# Patient Record
Sex: MALE | Race: WHITE | NOT HISPANIC OR LATINO | Employment: OTHER | URBAN - NONMETROPOLITAN AREA
[De-identification: names, ages, dates, MRNs, and addresses within clinical notes are randomized per-mention and may not be internally consistent; named-entity substitution may affect disease eponyms.]

---

## 2021-06-23 ENCOUNTER — TRANSCRIBE ORDERS (OUTPATIENT)
Dept: ADMINISTRATIVE | Facility: HOSPITAL | Age: 76
End: 2021-06-23

## 2021-06-23 DIAGNOSIS — R60.0 LOWER EXTREMITY EDEMA: Primary | ICD-10-CM

## 2021-08-06 ENCOUNTER — HOSPITAL ENCOUNTER (OUTPATIENT)
Dept: CARDIOLOGY | Facility: HOSPITAL | Age: 76
Discharge: HOME | End: 2021-08-06
Admitting: FAMILY MEDICINE

## 2021-08-06 VITALS
HEIGHT: 70 IN | SYSTOLIC BLOOD PRESSURE: 120 MMHG | DIASTOLIC BLOOD PRESSURE: 63 MMHG | BODY MASS INDEX: 24.34 KG/M2 | WEIGHT: 170 LBS

## 2021-08-06 DIAGNOSIS — R60.0 LOWER EXTREMITY EDEMA: ICD-10-CM

## 2021-08-06 PROCEDURE — 93356 MYOCRD STRAIN IMG SPCKL TRCK: CPT | Performed by: INTERNAL MEDICINE

## 2021-08-06 PROCEDURE — 93306 TTE W/DOPPLER COMPLETE: CPT

## 2021-08-06 PROCEDURE — 25010000002 PERFLUTREN 6.52 MG/ML SUSPENSION: Performed by: FAMILY MEDICINE

## 2021-08-06 PROCEDURE — 93306 TTE W/DOPPLER COMPLETE: CPT | Performed by: INTERNAL MEDICINE

## 2021-08-06 PROCEDURE — 93356 MYOCRD STRAIN IMG SPCKL TRCK: CPT

## 2021-08-06 RX ADMIN — PERFLUTREN 1.17 MG: 6.52 INJECTION, SUSPENSION INTRAVENOUS at 08:33

## 2021-08-09 LAB
BH CV ECHO MEAS - AO MAX PG (FULL): 3.9 MMHG
BH CV ECHO MEAS - AO MAX PG: 7.6 MMHG
BH CV ECHO MEAS - AO MEAN PG (FULL): 2 MMHG
BH CV ECHO MEAS - AO MEAN PG: 3 MMHG
BH CV ECHO MEAS - AO ROOT AREA (BSA CORRECTED): 1.5
BH CV ECHO MEAS - AO ROOT AREA: 7.1 CM^2
BH CV ECHO MEAS - AO ROOT DIAM: 3 CM
BH CV ECHO MEAS - AO V2 MAX: 138 CM/SEC
BH CV ECHO MEAS - AO V2 MEAN: 78.5 CM/SEC
BH CV ECHO MEAS - AO V2 VTI: 26.6 CM
BH CV ECHO MEAS - AVA(I,A): 2.2 CM^2
BH CV ECHO MEAS - AVA(I,D): 2.2 CM^2
BH CV ECHO MEAS - AVA(V,A): 2.2 CM^2
BH CV ECHO MEAS - AVA(V,D): 2.2 CM^2
BH CV ECHO MEAS - BSA(HAYCOCK): 2 M^2
BH CV ECHO MEAS - BSA: 2 M^2
BH CV ECHO MEAS - BZI_BMI: 25.4 KILOGRAMS/M^2
BH CV ECHO MEAS - BZI_METRIC_HEIGHT: 177.8 CM
BH CV ECHO MEAS - BZI_METRIC_WEIGHT: 80.3 KG
BH CV ECHO MEAS - EDV(CUBED): 88.1 ML
BH CV ECHO MEAS - EDV(MOD-SP2): 91.2 ML
BH CV ECHO MEAS - EDV(MOD-SP4): 102 ML
BH CV ECHO MEAS - EDV(TEICH): 90.1 ML
BH CV ECHO MEAS - EF(CUBED): 76.1 %
BH CV ECHO MEAS - EF(MOD-SP2): 61.2 %
BH CV ECHO MEAS - EF(MOD-SP4): 65.9 %
BH CV ECHO MEAS - EF(TEICH): 68.3 %
BH CV ECHO MEAS - ESV(CUBED): 21 ML
BH CV ECHO MEAS - ESV(MOD-SP2): 35.4 ML
BH CV ECHO MEAS - ESV(MOD-SP4): 34.8 ML
BH CV ECHO MEAS - ESV(TEICH): 28.5 ML
BH CV ECHO MEAS - FS: 38 %
BH CV ECHO MEAS - IVS/LVPW: 1.4
BH CV ECHO MEAS - IVSD: 0.98 CM
BH CV ECHO MEAS - LA DIMENSION: 3.2 CM
BH CV ECHO MEAS - LA/AO: 1.1
BH CV ECHO MEAS - LAT PEAK E' VEL: 12.9 CM/SEC
BH CV ECHO MEAS - LV DIASTOLIC VOL/BSA (35-75): 51.5 ML/M^2
BH CV ECHO MEAS - LV MASS(C)D: 117.4 GRAMS
BH CV ECHO MEAS - LV MASS(C)DI: 59.3 GRAMS/M^2
BH CV ECHO MEAS - LV MAX PG: 3.8 MMHG
BH CV ECHO MEAS - LV MEAN PG: 1 MMHG
BH CV ECHO MEAS - LV SYSTOLIC VOL/BSA (12-30): 17.6 ML/M^2
BH CV ECHO MEAS - LV V1 MAX: 96.9 CM/SEC
BH CV ECHO MEAS - LV V1 MEAN: 53.9 CM/SEC
BH CV ECHO MEAS - LV V1 VTI: 18.7 CM
BH CV ECHO MEAS - LVIDD: 4.5 CM
BH CV ECHO MEAS - LVIDS: 2.8 CM
BH CV ECHO MEAS - LVLD AP2: 8.8 CM
BH CV ECHO MEAS - LVLD AP4: 9 CM
BH CV ECHO MEAS - LVLS AP2: 7.6 CM
BH CV ECHO MEAS - LVLS AP4: 6.7 CM
BH CV ECHO MEAS - LVOT AREA (M): 3.1 CM^2
BH CV ECHO MEAS - LVOT AREA: 3.1 CM^2
BH CV ECHO MEAS - LVOT DIAM: 2 CM
BH CV ECHO MEAS - LVPWD: 0.69 CM
BH CV ECHO MEAS - MED PEAK E' VEL: 9.46 CM/SEC
BH CV ECHO MEAS - MR MAX PG: 13 MMHG
BH CV ECHO MEAS - MR MAX VEL: 179 CM/SEC
BH CV ECHO MEAS - MV A MAX VEL: 93.5 CM/SEC
BH CV ECHO MEAS - MV DEC TIME: 0.23 SEC
BH CV ECHO MEAS - MV E MAX VEL: 101 CM/SEC
BH CV ECHO MEAS - MV E/A: 1.1
BH CV ECHO MEAS - SI(AO): 94.9 ML/M^2
BH CV ECHO MEAS - SI(CUBED): 33.9 ML/M^2
BH CV ECHO MEAS - SI(LVOT): 29.6 ML/M^2
BH CV ECHO MEAS - SI(MOD-SP2): 28.2 ML/M^2
BH CV ECHO MEAS - SI(MOD-SP4): 33.9 ML/M^2
BH CV ECHO MEAS - SI(TEICH): 31 ML/M^2
BH CV ECHO MEAS - SV(AO): 188 ML
BH CV ECHO MEAS - SV(CUBED): 67.1 ML
BH CV ECHO MEAS - SV(LVOT): 58.7 ML
BH CV ECHO MEAS - SV(MOD-SP2): 55.8 ML
BH CV ECHO MEAS - SV(MOD-SP4): 67.2 ML
BH CV ECHO MEAS - SV(TEICH): 61.5 ML
BH CV ECHO MEAS - TR MAX VEL: 115 CM/SEC
BH CV ECHO MEASUREMENTS AVERAGE E/E' RATIO: 9.03
LEFT ATRIUM VOLUME INDEX: 18.3 ML/M2
LEFT ATRIUM VOLUME: 36.3 CM3
MAXIMAL PREDICTED HEART RATE: 146 BPM
STRESS TARGET HR: 124 BPM

## 2022-11-07 ENCOUNTER — OFFICE VISIT (OUTPATIENT)
Dept: GASTROENTEROLOGY | Facility: CLINIC | Age: 77
End: 2022-11-07

## 2022-11-07 VITALS
BODY MASS INDEX: 24.39 KG/M2 | HEIGHT: 70 IN | TEMPERATURE: 97.2 F | HEART RATE: 54 BPM | DIASTOLIC BLOOD PRESSURE: 80 MMHG | OXYGEN SATURATION: 98 % | SYSTOLIC BLOOD PRESSURE: 120 MMHG

## 2022-11-07 DIAGNOSIS — Z78.9 NONSMOKER: ICD-10-CM

## 2022-11-07 DIAGNOSIS — Z85.038 PERSONAL HISTORY OF COLON CANCER: Primary | ICD-10-CM

## 2022-11-07 PROCEDURE — S0260 H&P FOR SURGERY: HCPCS | Performed by: CLINICAL NURSE SPECIALIST

## 2022-11-07 RX ORDER — LATANOPROST 50 UG/ML
SOLUTION/ DROPS OPHTHALMIC
COMMUNITY

## 2022-11-07 RX ORDER — BIMATOPROST 0.01 %
DROPS OPHTHALMIC (EYE)
Status: ON HOLD | COMMUNITY
End: 2023-01-05

## 2022-11-07 NOTE — PROGRESS NOTES
Rishi Ricketts  1945 11/7/2022  Chief Complaint   Patient presents with   • Colonoscopy     Subjective   HPI  Rishi Ricketts is a 77 y.o. male who presents as a referral for preventative maintenance. He has no complaints of nausea or vomiting. No change in bowels. No wt loss. No BRBPR. No melena. There is no family hx for colon cancer. No abdominal pain.    Past Medical History:   Diagnosis Date   • Colon cancer (HCC)     2018 in [place] s/p resection   • Glaucoma    • History of colon polyps      Past Surgical History:   Procedure Laterality Date   • BACK SURGERY     • COLON RESECTION     • LUNG CANCER SURGERY      Left upper lobe removal   • PROSTATE SURGERY     • TONSILLECTOMY       Outpatient Medications Marked as Taking for the 11/7/22 encounter (Office Visit) with Tori Puckett APRN   Medication Sig Dispense Refill   • bimatoprost (Lumigan) 0.01 % ophthalmic drops One drop each eye     • latanoprost (XALATAN) 0.005 % ophthalmic solution latanoprost 0.005 % eye drops   INSTILL 1 DROP INTO AFFECTED EYE(S) BY OPHTHALMIC ROUTE ONCE DAILY INTHE EVENING       No Known Allergies  Social History     Socioeconomic History   • Marital status:    Tobacco Use   • Smoking status: Former     Types: Cigarettes   • Smokeless tobacco: Never   Substance and Sexual Activity   • Alcohol use: Yes     Comment: wine     Family History   Problem Relation Age of Onset   • Colon polyps Neg Hx    • Colon cancer Neg Hx      Health Maintenance   Topic Date Due   • COLORECTAL CANCER SCREENING  Never done   • ZOSTER VACCINE (1 of 2) Never done   • Pneumococcal Vaccine 65+ (1 - PCV) Never done   • COVID-19 Vaccine (3 - Booster for Moderna series) 04/22/2021   • INFLUENZA VACCINE  08/01/2022   • HEPATITIS C SCREENING  Never done   • ANNUAL WELLNESS VISIT  Never done   • TDAP/TD VACCINES (2 - Td or Tdap) 09/13/2027       REVIEW OF SYSTEMS  General: well appearing, no fever chills or sweats, no unexplained wt  "loss  HEENT: no acute visual or hearing disturbances  Cardiovascular: No chest pain or palpitations  Pulmonary: No shortness of breath, coughing, wheezing or hemoptysis  : No burning, urgency, hematuria, or dysuria  Musculoskeletal: No joint pain or stiffness  Peripheral: no edema  Skin: No lesions or rashes  Neuro: No dizziness, headaches, stroke, syncope  Endocrine: No hot or cold intolerances  Hematological: No blood dyscrasias    Objective   Vitals:    11/07/22 0818   BP: 120/80   Pulse: 54   Temp: 97.2 °F (36.2 °C)   TempSrc: Temporal   SpO2: 98%   Height: 177.8 cm (70\")     Body mass index is 24.39 kg/m².  BMI is within normal parameters. No other follow-up for BMI required.      PHYSICAL EXAM  General: age appropriate well nourished well appearing, no acute distress  Head: normocephalic and atraumatic  Global assessment-supple  Neck-No JVD noted, no lymphadenopathy  Pulmonary-clear to auscultation bilaterally, normal respiratory effort  Cardiovascular-normal rate and rhythm, normal heart sounds, S1 and S2 noted  Abdomen-soft, non tender, non distended, normal bowel sounds all 4 quadrants, no hepatosplenomegaly noted  Extremities-No clubbing cyanosis or edema  Neuro-Non focal, converses appropriately, awake, alert, oriented    Assessment & Plan     Diagnoses and all orders for this visit:    1. Personal history of colon cancer (Primary)  -     Case Request; Standing  -     Follow Anesthesia Guidelines / Protocol; Future  -     Obtain Informed Consent; Future  -     Implement Anesthesia Orders Day of Procedure; Standing  -     Obtain Informed Consent; Standing  -     Verify bowel prep was successful; Standing  -     Case Request  -     polyethylene glycol (GoLYTELY) 236 g solution; Take as directed by office instructions.  Dispense: 4000 mL; Refill: 0    2. Nonsmoker        COLONOSCOPY WITH ANESTHESIA (N/A)  Body mass index is 24.39 kg/m².    Patient instructions on prep prior to procedure provided to the " patient.    All risks, benefits, alternatives, and indications of colonoscopy procedure have been discussed with the patient. Risks to include perforation of the colon requiring possible surgery or colostomy, risk of bleeding from biopsies or removal of colon tissue, possibility of missing a colon polyp or cancer, or adverse drug reaction.  Benefits to include the diagnosis and management of disease of the colon and rectum. Alternatives to include barium enema, radiographic evaluation, lab testing or no intervention. Pt verbalizes understanding and agrees.     Tori Puckett, APRN  2022  08:41 CST      IF YOU SMOKE OR USE TOBACCO PLEASE READ THE FOLLOWIN minutes reading provided    Why is smoking bad for me?  Smoking increases the risk of heart disease, lung disease, vascular disease, stroke, and cancer.     If you smoke, STOP!    If you would like more information on quitting smoking, please visit the [place] website: [email or web address]   This link will provide additional resources including the QUIT line and the Beat the Pack support groups.     For more information:    Quit Now [facility]  -QUIT-NOW  [email or web address]

## 2022-11-08 PROBLEM — Z12.11 ENCOUNTER FOR SCREENING FOR MALIGNANT NEOPLASM OF COLON: Status: ACTIVE | Noted: 2022-11-08

## 2023-01-05 ENCOUNTER — HOSPITAL ENCOUNTER (OUTPATIENT)
Facility: HOSPITAL | Age: 78
Setting detail: HOSPITAL OUTPATIENT SURGERY
Discharge: HOME | End: 2023-01-05
Attending: INTERNAL MEDICINE | Admitting: INTERNAL MEDICINE
Payer: MEDICARE

## 2023-01-05 ENCOUNTER — ANESTHESIA (OUTPATIENT)
Dept: GASTROENTEROLOGY | Facility: HOSPITAL | Age: 78
End: 2023-01-05
Payer: MEDICARE

## 2023-01-05 ENCOUNTER — ANESTHESIA EVENT (OUTPATIENT)
Dept: GASTROENTEROLOGY | Facility: HOSPITAL | Age: 78
End: 2023-01-05
Payer: MEDICARE

## 2023-01-05 VITALS
DIASTOLIC BLOOD PRESSURE: 57 MMHG | HEIGHT: 70 IN | WEIGHT: 173 LBS | HEART RATE: 50 BPM | OXYGEN SATURATION: 100 % | BODY MASS INDEX: 24.77 KG/M2 | SYSTOLIC BLOOD PRESSURE: 117 MMHG | RESPIRATION RATE: 16 BRPM | TEMPERATURE: 97.1 F

## 2023-01-05 DIAGNOSIS — Z85.038 PERSONAL HISTORY OF COLON CANCER: ICD-10-CM

## 2023-01-05 DIAGNOSIS — Z12.11 ENCOUNTER FOR SCREENING FOR MALIGNANT NEOPLASM OF COLON: ICD-10-CM

## 2023-01-05 PROCEDURE — 88305 TISSUE EXAM BY PATHOLOGIST: CPT | Performed by: INTERNAL MEDICINE

## 2023-01-05 PROCEDURE — 45385 COLONOSCOPY W/LESION REMOVAL: CPT | Performed by: INTERNAL MEDICINE

## 2023-01-05 PROCEDURE — 25010000002 PROPOFOL 10 MG/ML EMULSION: Performed by: NURSE ANESTHETIST, CERTIFIED REGISTERED

## 2023-01-05 RX ORDER — TIMOLOL MALEATE 5 MG/ML
1 SOLUTION/ DROPS OPHTHALMIC 2 TIMES DAILY
COMMUNITY

## 2023-01-05 RX ORDER — PROPOFOL 10 MG/ML
VIAL (ML) INTRAVENOUS AS NEEDED
Status: DISCONTINUED | OUTPATIENT
Start: 2023-01-05 | End: 2023-01-05 | Stop reason: SURG

## 2023-01-05 RX ORDER — SODIUM CHLORIDE 9 MG/ML
500 INJECTION, SOLUTION INTRAVENOUS CONTINUOUS PRN
Status: DISCONTINUED | OUTPATIENT
Start: 2023-01-05 | End: 2023-01-05 | Stop reason: HOSPADM

## 2023-01-05 RX ORDER — ZINC GLUCONATE 50 MG
TABLET ORAL
COMMUNITY

## 2023-01-05 RX ORDER — LIDOCAINE HYDROCHLORIDE 10 MG/ML
0.5 INJECTION, SOLUTION EPIDURAL; INFILTRATION; INTRACAUDAL; PERINEURAL ONCE AS NEEDED
Status: DISCONTINUED | OUTPATIENT
Start: 2023-01-05 | End: 2023-01-05 | Stop reason: HOSPADM

## 2023-01-05 RX ORDER — SODIUM CHLORIDE 0.9 % (FLUSH) 0.9 %
10 SYRINGE (ML) INJECTION AS NEEDED
Status: DISCONTINUED | OUTPATIENT
Start: 2023-01-05 | End: 2023-01-05 | Stop reason: HOSPADM

## 2023-01-05 RX ADMIN — PROPOFOL 70 MG: 10 INJECTION, EMULSION INTRAVENOUS at 10:20

## 2023-01-05 RX ADMIN — SODIUM CHLORIDE 500 ML: 9 INJECTION, SOLUTION INTRAVENOUS at 09:28

## 2023-01-05 RX ADMIN — PROPOFOL 70 MG: 10 INJECTION, EMULSION INTRAVENOUS at 10:15

## 2023-01-05 NOTE — H&P
[facility]  Pre Procedure History & Physical    Chief Complaint:   History of colon cancer    Subjective     HPI:   Here for colonoscopy.  History of colon cancer    Past Medical History:   Past Medical History:   Diagnosis Date   • Colon cancer (HCC)     2018 in [place] s/p resection   • Glaucoma    • History of colon polyps        Past Surgical History:  Past Surgical History:   Procedure Laterality Date   • BACK SURGERY     • COLON RESECTION     • LUNG CANCER SURGERY      Left upper lobe removal   • PROSTATE SURGERY     • TONSILLECTOMY         Family History:  Family History   Problem Relation Age of Onset   • Colon polyps Neg Hx    • Colon cancer Neg Hx        Social History:   reports that he has quit smoking. His smoking use included cigarettes. He has never used smokeless tobacco. He reports current alcohol use. He reports that he does not currently use drugs.    Medications:   Prior to Admission medications    Medication Sig Start Date End Date Taking? Authorizing Provider   latanoprost (XALATAN) 0.005 % ophthalmic solution latanoprost 0.005 % eye drops   INSTILL 1 DROP INTO AFFECTED EYE(S) BY OPHTHALMIC ROUTE ONCE DAILY INTHE EVENING   Yes Wali Guan MD   timolol (TIMOPTIC) 0.5 % ophthalmic solution 1 drop 2 (Two) Times a Day.   Yes ProviderWali MD   Zinc 50 MG tablet Take  by mouth.    ProviderWali MD   bimatoprost (Lumigan) 0.01 % ophthalmic drops One drop each eye  1/5/23  ProviderWali MD   polyethylene glycol (GoLYTELY) 236 g solution Take as directed by office instructions. 11/7/22 1/5/23  Tori Puckett APRN       Allergies:  Patient has no known allergies.    Objective     Blood pressure 147/60, pulse 53, temperature 97.1 °F (36.2 °C), temperature source Temporal, resp. rate 18, height 177.8 cm (70\"), weight 78.5 kg (173 lb), SpO2 97 %.    Physical Exam   Constitutional: Pt is oriented to person, place, and in no distress.   HENT:  Mouth/Throat: Oropharynx is clear.   Cardiovascular: Normal rate, regular rhythm.    Pulmonary/Chest: Effort normal. No respiratory distress. No  wheezes.   Abdominal: Soft. Non-distended.  Skin: Skin is warm and dry.   Psychiatric: Mood, memory, affect and judgment appear normal.     Assessment & Plan     Diagnosis:  History of colon cancer    Anticipated Surgical Procedure:    Proceed with colonoscopy as scheduled    The following major R/B/A were discussed with the patient, however the list is not all inclusive . Risk:  Bleeding (immediate and delayed), perforation (rupture or tear), reaction to medication, missed lesion/cancer, pain during the procedure, infection, need for surgery, need for ostomy, need for mechanical ventilation (breathing machine), death.  Benefits: removal of polyp/tissue, burn/clip/or inject to stop bleeding, removal of foreign body, dilate any stricture.  Alternatives: Xray or CT, surgery, do nothing with associated risk   The patient was given time to ask question and received explanation, and agrees to proceed as per History and Physical.   No guarantee given or expressed.    EMR Dragon/transcription disclaimer: Much of this encounter note is an electronic transcription/translation of spoken language to printed text.  The electronic translation of spoken language may permit erroneous, or at times, nonsensical words or phrases to be inadvertently transcribed.  Although I have reviewed the note for such errors, some may still exist.    Desean Llanos MD  10:14 CST  1/5/2023     Instructed patient/caregiver to follow-up with primary care physician./Instructed patient/caregiver regarding signs and symptoms of infection./Elevate the injured extremity as instructed./Verbal/written post procedure instructions were given to patient/caregiver.

## 2023-01-05 NOTE — ANESTHESIA PREPROCEDURE EVALUATION
Anesthesia Evaluation     Patient summary reviewed   no history of anesthetic complications:  NPO Solid Status: > 8 hours             Airway   Mallampati: II  TM distance: >3 FB  Neck ROM: full  No difficulty expected  Dental - normal exam     Pulmonary    (+) lung cancer (s/p left upper lobectomy),   Cardiovascular - negative cardio ROS  Exercise tolerance: excellent (>7 METS)        Neuro/Psych- negative ROS  GI/Hepatic/Renal/Endo - negative ROS     Musculoskeletal     Abdominal    Substance History      OB/GYN          Other      history of cancer (colon 2018)    ROS/Med Hx Other: glaucoma                  Anesthesia Plan    ASA 2     MAC     (Patient voiced concerns about receiving anesthesia and states he would like to consider being awake for procedure. I went ahead and discussed care and consented in event he elects to proceed with anesthesia after discussion with Dr. Llanos)  intravenous induction     Anesthetic plan, risks, benefits, and alternatives have been provided, discussed and informed consent has been obtained with: patient and spouse/significant other.        CODE STATUS:

## 2023-01-05 NOTE — ANESTHESIA POSTPROCEDURE EVALUATION
Patient: Rishi Ricketts    Procedure Summary     Date: 01/05/23 Room / Location: [facility] ENDOSCOPY 4 / [facility]    Anesthesia Start: 1015 Anesthesia Stop: 1032    Procedure: COLONOSCOPY WITH ANESTHESIA Diagnosis:       Encounter for screening for malignant neoplasm of colon      (Encounter for screening for malignant neoplasm of colon [Z12.11])    Surgeons: Desean Llanos MD Provider: Felipe Wiggins CRNA    Anesthesia Type: MAC ASA Status: 2          Anesthesia Type: MAC    Vitals  Vitals Value Taken Time   BP     Temp     Pulse 56 01/05/23 1033   Resp     SpO2 99 % 01/05/23 1033   Vitals shown include unvalidated device data.        Post Anesthesia Care and Evaluation    Patient location during evaluation: PHASE II  Patient participation: complete - patient participated  Level of consciousness: awake and alert  Pain management: adequate    Airway patency: patent  Anesthetic complications: No anesthetic complications  PONV Status: none  Cardiovascular status: acceptable and stable  Respiratory status: acceptable and unassisted  Hydration status: acceptable

## 2023-01-06 LAB
CYTO UR: NORMAL
LAB AP CASE REPORT: NORMAL
Lab: NORMAL
PATH REPORT.FINAL DX SPEC: NORMAL
PATH REPORT.GROSS SPEC: NORMAL

## 2023-11-16 ENCOUNTER — TRANSCRIBE ORDERS (OUTPATIENT)
Dept: ADMINISTRATIVE | Facility: HOSPITAL | Age: 78
End: 2023-11-16
Payer: MEDICARE

## 2023-11-16 DIAGNOSIS — C34.90 MALIGNANT NEOPLASM OF BRONCHUS AND LUNG: Primary | ICD-10-CM

## 2023-11-30 ENCOUNTER — HOSPITAL ENCOUNTER (OUTPATIENT)
Dept: CT IMAGING | Facility: HOSPITAL | Age: 78
Discharge: HOME | End: 2023-11-30
Admitting: FAMILY MEDICINE
Payer: MEDICARE

## 2023-11-30 DIAGNOSIS — C34.90 MALIGNANT NEOPLASM OF BRONCHUS AND LUNG: ICD-10-CM

## 2023-11-30 LAB — CREAT BLDA-MCNC: 0.9 MG/DL (ref 0.6–1.3)

## 2023-11-30 PROCEDURE — 74178 CT ABD&PLV WO CNTR FLWD CNTR: CPT

## 2023-11-30 PROCEDURE — 82565 ASSAY OF CREATININE: CPT

## 2023-11-30 PROCEDURE — 25510000001 IOPAMIDOL 61 % SOLUTION: Performed by: FAMILY MEDICINE

## 2023-11-30 PROCEDURE — 71260 CT THORAX DX C+: CPT

## 2023-11-30 RX ADMIN — IOPAMIDOL 100 ML: 612 INJECTION, SOLUTION INTRAVENOUS at 09:28

## 2024-01-16 ENCOUNTER — OFFICE VISIT (OUTPATIENT)
Dept: PULMONOLOGY | Facility: CLINIC | Age: 79
End: 2024-01-16
Payer: MEDICARE

## 2024-01-16 VITALS
BODY MASS INDEX: 23.19 KG/M2 | HEIGHT: 70 IN | HEART RATE: 76 BPM | WEIGHT: 162 LBS | DIASTOLIC BLOOD PRESSURE: 70 MMHG | SYSTOLIC BLOOD PRESSURE: 136 MMHG | OXYGEN SATURATION: 100 %

## 2024-01-16 DIAGNOSIS — R91.8 LUNG INFILTRATE: Primary | ICD-10-CM

## 2024-01-16 DIAGNOSIS — Z29.11 NEED FOR PROPHYLACTIC VACCINATION AND INOCULATION AGAINST RESPIRATORY SYNCYTIAL VIRUS (RSV): ICD-10-CM

## 2024-01-16 DIAGNOSIS — Z85.118 HISTORY OF LUNG CANCER: ICD-10-CM

## 2024-01-16 PROCEDURE — 99204 OFFICE O/P NEW MOD 45 MIN: CPT | Performed by: INTERNAL MEDICINE

## 2024-01-16 RX ORDER — FLUTICASONE PROPIONATE 50 MCG
SPRAY, SUSPENSION (ML) NASAL
COMMUNITY

## 2024-01-16 RX ORDER — TURMERIC 400 MG
CAPSULE ORAL
COMMUNITY

## 2024-01-16 NOTE — PROGRESS NOTES
Background:  Pt w hx lung ca, tree in bud ct infiltrate, hx colon ca.   Chief Complaint  abnormal findings of diagnostic imaging    Subjective    History of Present Illness    Rishi Ricketts presents to [facility].  History of Present Illness  I am asked to see him to evaluate lung nodules.  He is s/p CHRISTINA lobectomy for lung cancer in 2019, prostate cancer resection 2015, colon cancer resection in 2018.  He had routine follow up scanning in [place] a year ago, and has since established in this area.  He recently purchased a home which he has been renovating extensively, and he has been doing seeding and other outdoor work stirring up dust this fall.  His newest ct was done locally for follow up showing some nodular and tree in bud findings in the LLL.  We are asked to evaluate,  no symptoms currently.  He has brought his old films from [facility] on a disc.         has a past medical history of Colon cancer, Glaucoma, and History of colon polyps.   has a past surgical history that includes Colectomy; Prostate surgery; Lung cancer surgery; Tonsillectomy; Back surgery; and Colonoscopy (N/A, 1/5/2023).  family history is not on file.   reports that he quit smoking about 16 years ago. His smoking use included cigarettes. He has a 20.00 pack-year smoking history. He has never used smokeless tobacco. He reports current alcohol use. He reports that he does not currently use drugs.  No Known Allergies  Current Outpatient Medications   Medication Instructions    Ascorbic Acid (Vitamin C) 500 MG chewable tablet     fluticasone (FLONASE) 50 MCG/ACT nasal spray fluticasone propionate 50 mcg/actuation nasal spray,suspension   Spray 1 spray every day by intranasal route.    latanoprost (XALATAN) 0.005 % ophthalmic solution latanoprost 0.005 % eye drops   INSTILL 1 DROP INTO AFFECTED EYE(S) BY OPHTHALMIC ROUTE ONCE DAILY INTHE EVENING    NON FORMULARY Beet  "supplement 500 mg once a day    RSVPreF3 Vac Recomb Adjuvanted (AREXVY) 120 mcg, Intramuscular, Once    timolol (TIMOPTIC) 0.5 % ophthalmic solution 1 drop, 2 Times Daily    Turmeric 400 MG capsule Oral    Zinc 50 MG tablet Oral      Objective     Vital Signs:   /70   Pulse 76   Ht 177.8 cm (70\")   Wt 73.5 kg (162 lb)   SpO2 100% Comment: RA  BMI 23.24 kg/m²   Physical Exam  Constitutional:       Appearance: Normal appearance. He is not ill-appearing or diaphoretic.   Eyes:      Extraocular Movements: Extraocular movements intact.   Pulmonary:      Effort: Pulmonary effort is normal. No respiratory distress.      Breath sounds: No wheezing, rhonchi or rales.   Skin:     Coloration: Skin is not jaundiced.      Findings: No erythema or rash.   Neurological:      Mental Status: He is alert.        Result Review  Data Reviewed:    CT Chest With Contrast Diagnostic (11/30/2023 09:28)     Lungs/Pleura: Prior postsurgical changes in the left lung. Tree-in-bud  as well as small nodular opacities are noted in the posterior left lung.  Minimal peripheral parenchymal scarring.  Nodules: No suspicious pulmonary nodules.  Lower Neck: Subcentimeter right thyroid nodule.  Mediastinum/Hilum: No enlarged lymphadenopathy. Small hiatal hernia.  Personal review of imaging : CT scan shows small nodules, tree in bud densities on left               Assessment and Plan   Diagnoses and all orders for this visit:    1. Lung infiltrate (Primary)  Comments:  Tree in bud idntified 2023  Orders:  -     CT Chest Without Contrast Diagnostic; Future    2. Need for prophylactic vaccination and inoculation against respiratory syncytial virus (RSV)  -     RSVPreF3 Vac Recomb Adjuvanted (AREXVY) 120 MCG/0.5ML reconstituted suspension injection; Inject 0.5 mL into the appropriate muscle as directed by prescriber 1 (One) Time for 1 dose.  Dispense: 1 each; Refill: 0    3. History of lung cancer  Comments:  CHRISTINA resected 2019, no adjuvant tx " needed    We have pulled up the previous scan from 2022 at [facility].  There is some mild tree in bud nodularity in the LLL on that film which looks largely the same.  More central area might be a little more prominent on the scan from November.  Given his history, there is an obvious concern for cancer with any xray abnormality for him.  Will plan 3 mos follow up ct which will be next month, load outside films into pacs for comparison and reassess.  If there is a progressive lesion in LLL, will consider ion robotic bronchoscopy to assess further, otherwise continue close surveillance    Follow Up   Return in about 1 month (around 2/16/2024) for review CT.  Patient was given instructions and counseling regarding his condition or for health maintenance advice. Please see specific information pulled into the AVS if appropriate.    Electronically signed by Bruno Sands MD, 1/16/2024, 17:25 CST

## 2024-01-24 ENCOUNTER — PATIENT ROUNDING (BHMG ONLY) (OUTPATIENT)
Dept: PULMONOLOGY | Facility: CLINIC | Age: 79
End: 2024-01-24
Payer: MEDICARE

## 2024-01-24 NOTE — PROGRESS NOTES
January 24, 2024    Hello, may I speak with Rishi Ricketts?    My name is Violet Ricketts    I am  with [facility]  [address]  [facility] [address] [phone].    Before we get started may I verify your date of birth? 1945    I am calling to officially welcome you to our practice and ask about your recent visit. Is this a good time to talk? yes    Tell me about your visit with us. What things went well?  Office has good set up.  Provider was detailed in explanations.       We're always looking for ways to make our patients' experiences even better. Do you have recommendations on ways we may improve?  no    Overall were you satisfied with your first visit to our practice? yes       I appreciate you taking the time to speak with me today. Is there anything else I can do for you? no      Thank you, and have a great day.

## 2024-02-13 NOTE — PROGRESS NOTES
Chief Complaint  Lung infiltrate    Subjective    History of Present Illness {CC  Problem List  Visit Diagnosis   Encounters  Notes  Medications  Labs  Result Review Imaging  Media     iRshi Ricketts presents to [facility] for:    History of Present Illness  Mr. Ricketts is here for follow up nodular infiltrate in left lower lobe. He is due for repeat ct chest now. Has a history of lung cancer and CHRISTINA lobectomy in 2019. Prostate cancer resection 2015 and colon cancer resection in 2018.  He quit smoking in .  He denies any dyspnea or coughing or hemoptysis.         Prior to Admission medications    Medication Sig Start Date End Date Taking? Authorizing Provider   Ascorbic Acid (Vitamin C) 500 MG chewable tablet  23   Wali Guan MD   fluticasone (FLONASE) 50 MCG/ACT nasal spray fluticasone propionate 50 mcg/actuation nasal spray,suspension   Spray 1 spray every day by intranasal route.    Wali Guan MD   latanoprost (XALATAN) 0.005 % ophthalmic solution latanoprost 0.005 % eye drops   INSTILL 1 DROP INTO AFFECTED EYE(S) BY OPHTHALMIC ROUTE ONCE DAILY INTHE EVENING    Wali Guan MD   NON FORMULARY Beet supplement 500 mg once a day 23   Wali Guan MD   timolol (TIMOPTIC) 0.5 % ophthalmic solution 1 drop 2 (Two) Times a Day.    Wali Guan MD   Turmeric 400 MG capsule Take  by mouth.    Wali Guan MD   Zinc 50 MG tablet Take  by mouth.    Wali Guan MD       Social History     Socioeconomic History    Marital status:    Tobacco Use    Smoking status: Former     Packs/day: 1.00     Years: 22.00     Additional pack years: 0.00     Total pack years: 22.00     Types: Cigarettes     Start date: 1967     Quit date: 1989     Years since quittin.1     Passive exposure: Never    Smokeless tobacco: Never    Tobacco comments:     light smoker   Vaping Use     "Vaping Use: Never used   Substance and Sexual Activity    Alcohol use: Yes     Comment: Two glasses of wine per month at most    Drug use: Never    Sexual activity: Not Currently     Partners: Female       Objective   Vital Signs:   /60   Pulse 71   Ht 177.8 cm (70\")   Wt 68.9 kg (152 lb)   SpO2 98% Comment: RA  BMI 21.81 kg/m²     Physical Exam  Constitutional:       General: He is not in acute distress.  HENT:      Head: Normocephalic.      Nose: Nose normal.      Mouth/Throat:      Mouth: Mucous membranes are moist.   Eyes:      General: No scleral icterus.  Cardiovascular:      Rate and Rhythm: Normal rate.   Pulmonary:      Effort: No respiratory distress.   Abdominal:      General: There is no distension.   Neurological:      Mental Status: He is alert and oriented to person, place, and time.   Psychiatric:         Mood and Affect: Mood normal.         Behavior: Behavior is cooperative.        Result Review :{ Labs  Result Review  Imaging  Med Tab  Media :          No results found for this or any previous visit.                     My interpretation of imaging: New areas of nodular infiltrates in the left lung, nodular infiltrate within the posterior left lung increased in size to approximately 3.6 x 0.9 cm      Assessment and Plan {CC Problem List  Visit Diagnosis  ROS  Review (Popup)  Health Maintenance  Quality  BestPractice  Medications  SmartSets  SnapShot Encounters  Media      Diagnoses and all orders for this visit:    1. Lung infiltrate (Primary)    2. History of lung cancer        BMI is within normal parameters. No other follow-up for BMI required.      We reviewed CT chest and compared to prior.  There are some areas on the CT scan that are more prominent than on the previous scan in particular the posterior left lung showing an increased and nodular infiltrate to approximately 3.6 x 0.9 cm.  Dr. Sands reviewed the CT scan as well and is recommending Ion robotic " bronchoscopy to assess further.  We discussed nature of procedure and risks and benefits.  He is not on any blood thinning medication.  Will schedule for procedure with Dr. Sands.  Office follow-up in approximately 6 weeks for postprocedural visit.  Call in the interim if needed.    Malou Kelly, APRN  2/20/2024  16:29 CST    Follow Up {Instructions Charge Capture  Follow-up Communications   Return in about 6 weeks (around 4/2/2024).    Patient was given instructions and counseling regarding his condition or for health maintenance advice. Please see specific information pulled into the AVS if appropriate.

## 2024-02-16 ENCOUNTER — HOSPITAL ENCOUNTER (OUTPATIENT)
Dept: CT IMAGING | Facility: HOSPITAL | Age: 79
Discharge: HOME | End: 2024-02-16
Payer: MEDICARE

## 2024-02-16 DIAGNOSIS — R91.8 LUNG INFILTRATE: ICD-10-CM

## 2024-02-16 PROCEDURE — 71250 CT THORAX DX C-: CPT

## 2024-02-20 ENCOUNTER — PREP FOR SURGERY (OUTPATIENT)
Dept: OTHER | Facility: HOSPITAL | Age: 79
End: 2024-02-20
Payer: MEDICARE

## 2024-02-20 ENCOUNTER — OFFICE VISIT (OUTPATIENT)
Dept: PULMONOLOGY | Facility: CLINIC | Age: 79
End: 2024-02-20
Payer: MEDICARE

## 2024-02-20 VITALS
DIASTOLIC BLOOD PRESSURE: 60 MMHG | HEART RATE: 71 BPM | SYSTOLIC BLOOD PRESSURE: 110 MMHG | HEIGHT: 70 IN | OXYGEN SATURATION: 98 % | BODY MASS INDEX: 21.76 KG/M2 | WEIGHT: 152 LBS

## 2024-02-20 DIAGNOSIS — R91.8 LUNG INFILTRATE: Primary | Chronic | ICD-10-CM

## 2024-02-20 DIAGNOSIS — R91.1 NODULE OF LEFT LUNG: ICD-10-CM

## 2024-02-20 DIAGNOSIS — Z85.118 HISTORY OF LUNG CANCER: ICD-10-CM

## 2024-02-20 DIAGNOSIS — R91.8 LUNG INFILTRATE: Primary | ICD-10-CM

## 2024-02-20 PROCEDURE — 1160F RVW MEDS BY RX/DR IN RCRD: CPT | Performed by: NURSE PRACTITIONER

## 2024-02-20 PROCEDURE — 1159F MED LIST DOCD IN RCRD: CPT | Performed by: NURSE PRACTITIONER

## 2024-02-20 PROCEDURE — 99213 OFFICE O/P EST LOW 20 MIN: CPT | Performed by: NURSE PRACTITIONER

## 2024-02-20 RX ORDER — SODIUM CHLORIDE 9 MG/ML
40 INJECTION, SOLUTION INTRAVENOUS AS NEEDED
OUTPATIENT
Start: 2024-02-20

## 2024-02-20 RX ORDER — SODIUM CHLORIDE 0.9 % (FLUSH) 0.9 %
10 SYRINGE (ML) INJECTION AS NEEDED
OUTPATIENT
Start: 2024-02-20

## 2024-02-20 RX ORDER — SODIUM CHLORIDE 0.9 % (FLUSH) 0.9 %
10 SYRINGE (ML) INJECTION EVERY 12 HOURS SCHEDULED
OUTPATIENT
Start: 2024-02-20

## 2024-02-20 NOTE — H&P
Chief Complaint  Lung infiltrate    Subjective    History of Present Illness {CC  Problem List  Visit Diagnosis   Encounters  Notes  Medications  Labs  Result Review Imaging  Media     Rishi Ricketts presents to [facility] PULMONARY & CRITICAL CARE MEDICINE for:    History of Present Illness  Mr. Ricketts is here for follow up nodular infiltrate in left lower lobe. He is due for repeat ct chest now. Has a history of lung cancer and CHRISTINA lobectomy in 2019. Prostate cancer resection 2015 and colon cancer resection in 2018.  He quit smoking in .  He denies any dyspnea or coughing or hemoptysis.         Prior to Admission medications    Medication Sig Start Date End Date Taking? Authorizing Provider   Ascorbic Acid (Vitamin C) 500 MG chewable tablet  23   Wali Guan MD   fluticasone (FLONASE) 50 MCG/ACT nasal spray fluticasone propionate 50 mcg/actuation nasal spray,suspension   Spray 1 spray every day by intranasal route.    Wali Guan MD   latanoprost (XALATAN) 0.005 % ophthalmic solution latanoprost 0.005 % eye drops   INSTILL 1 DROP INTO AFFECTED EYE(S) BY OPHTHALMIC ROUTE ONCE DAILY INTHE EVENING    Wali Guan MD   NON FORMULARY Beet supplement 500 mg once a day 23   Wali Guan MD   timolol (TIMOPTIC) 0.5 % ophthalmic solution 1 drop 2 (Two) Times a Day.    Wali Guan MD   Turmeric 400 MG capsule Take  by mouth.    Wali Guan MD   Zinc 50 MG tablet Take  by mouth.    Wali Guan MD       Social History     Socioeconomic History    Marital status:    Tobacco Use    Smoking status: Former     Packs/day: 1.00     Years: 22.00     Additional pack years: 0.00     Total pack years: 22.00     Types: Cigarettes     Start date: 1967     Quit date: 1989     Years since quittin.1     Passive exposure: Never    Smokeless tobacco: Never    Tobacco comments:     light smoker   Vaping Use     "Vaping Use: Never used   Substance and Sexual Activity    Alcohol use: Yes     Comment: Two glasses of wine per month at most    Drug use: Never    Sexual activity: Not Currently     Partners: Female       Objective   Vital Signs:   /60   Pulse 71   Ht 177.8 cm (70\")   Wt 68.9 kg (152 lb)   SpO2 98% Comment: RA  BMI 21.81 kg/m²     Physical Exam  Constitutional:       General: He is not in acute distress.  HENT:      Head: Normocephalic.      Nose: Nose normal.      Mouth/Throat:      Mouth: Mucous membranes are moist.   Eyes:      General: No scleral icterus.  Cardiovascular:      Rate and Rhythm: Normal rate.   Pulmonary:      Effort: No respiratory distress.   Abdominal:      General: There is no distension.   Neurological:      Mental Status: He is alert and oriented to person, place, and time.   Psychiatric:         Mood and Affect: Mood normal.         Behavior: Behavior is cooperative.        Result Review :{ Labs  Result Review  Imaging  Med Tab  Media :          No results found for this or any previous visit.                     My interpretation of imaging: New areas of nodular infiltrates in the left lung, nodular infiltrate within the posterior left lung increased in size to approximately 3.6 x 0.9 cm      Assessment and Plan {CC Problem List  Visit Diagnosis  ROS  Review (Popup)  Health Maintenance  Quality  BestPractice  Medications  SmartSets  SnapShot Encounters  Media      Diagnoses and all orders for this visit:    1. Lung infiltrate (Primary)    2. History of lung cancer        BMI is within normal parameters. No other follow-up for BMI required.      We reviewed CT chest and compared to prior.  There are some areas on the CT scan that are more prominent than on the previous scan in particular the posterior left lung showing an increased and nodular infiltrate to approximately 3.6 x 0.9 cm.  Dr. Sands reviewed the CT scan as well and is recommending Ion robotic " bronchoscopy to assess further.  We discussed nature of procedure and risks and benefits.  He is not on any blood thinning medication.  Will schedule for procedure with Dr. Sands.  Office follow-up in approximately 6 weeks for postprocedural visit.  Call in the interim if needed.    Malou Kelly, APRN  2/20/2024  16:29 CST    Follow Up {Instructions Charge Capture  Follow-up Communications   Return in about 6 weeks (around 4/2/2024).    Patient was given instructions and counseling regarding his condition or for health maintenance advice. Please see specific information pulled into the AVS if appropriate.

## 2024-02-20 NOTE — H&P (VIEW-ONLY)
Chief Complaint  Lung infiltrate    Subjective    History of Present Illness {CC  Problem List  Visit Diagnosis   Encounters  Notes  Medications  Labs  Result Review Imaging  Media     Rishi Ricketts presents to [facility] PULMONARY & CRITICAL CARE MEDICINE for:    History of Present Illness  Mr. Ricketts is here for follow up nodular infiltrate in left lower lobe. He is due for repeat ct chest now. Has a history of lung cancer and CHRISTINA lobectomy in 2019. Prostate cancer resection 2015 and colon cancer resection in 2018.  He quit smoking in .  He denies any dyspnea or coughing or hemoptysis.         Prior to Admission medications    Medication Sig Start Date End Date Taking? Authorizing Provider   Ascorbic Acid (Vitamin C) 500 MG chewable tablet  23   Wali Guan MD   fluticasone (FLONASE) 50 MCG/ACT nasal spray fluticasone propionate 50 mcg/actuation nasal spray,suspension   Spray 1 spray every day by intranasal route.    Wali Guan MD   latanoprost (XALATAN) 0.005 % ophthalmic solution latanoprost 0.005 % eye drops   INSTILL 1 DROP INTO AFFECTED EYE(S) BY OPHTHALMIC ROUTE ONCE DAILY INTHE EVENING    Wali Guan MD   NON FORMULARY Beet supplement 500 mg once a day 23   Wali Guan MD   timolol (TIMOPTIC) 0.5 % ophthalmic solution 1 drop 2 (Two) Times a Day.    Wali Guan MD   Turmeric 400 MG capsule Take  by mouth.    Wali Guan MD   Zinc 50 MG tablet Take  by mouth.    Wali Guan MD       Social History     Socioeconomic History    Marital status:    Tobacco Use    Smoking status: Former     Packs/day: 1.00     Years: 22.00     Additional pack years: 0.00     Total pack years: 22.00     Types: Cigarettes     Start date: 1967     Quit date: 1989     Years since quittin.1     Passive exposure: Never    Smokeless tobacco: Never    Tobacco comments:     light smoker   Vaping Use     "Vaping Use: Never used   Substance and Sexual Activity    Alcohol use: Yes     Comment: Two glasses of wine per month at most    Drug use: Never    Sexual activity: Not Currently     Partners: Female       Objective   Vital Signs:   /60   Pulse 71   Ht 177.8 cm (70\")   Wt 68.9 kg (152 lb)   SpO2 98% Comment: RA  BMI 21.81 kg/m²     Physical Exam  Constitutional:       General: He is not in acute distress.  HENT:      Head: Normocephalic.      Nose: Nose normal.      Mouth/Throat:      Mouth: Mucous membranes are moist.   Eyes:      General: No scleral icterus.  Cardiovascular:      Rate and Rhythm: Normal rate.   Pulmonary:      Effort: No respiratory distress.   Abdominal:      General: There is no distension.   Neurological:      Mental Status: He is alert and oriented to person, place, and time.   Psychiatric:         Mood and Affect: Mood normal.         Behavior: Behavior is cooperative.        Result Review :{ Labs  Result Review  Imaging  Med Tab  Media :          No results found for this or any previous visit.                     My interpretation of imaging: New areas of nodular infiltrates in the left lung, nodular infiltrate within the posterior left lung increased in size to approximately 3.6 x 0.9 cm      Assessment and Plan {CC Problem List  Visit Diagnosis  ROS  Review (Popup)  Health Maintenance  Quality  BestPractice  Medications  SmartSets  SnapShot Encounters  Media      Diagnoses and all orders for this visit:    1. Lung infiltrate (Primary)    2. History of lung cancer        BMI is within normal parameters. No other follow-up for BMI required.      We reviewed CT chest and compared to prior.  There are some areas on the CT scan that are more prominent than on the previous scan in particular the posterior left lung showing an increased and nodular infiltrate to approximately 3.6 x 0.9 cm.  Dr. Sands reviewed the CT scan as well and is recommending Ion robotic " bronchoscopy to assess further.  We discussed nature of procedure and risks and benefits.  He is not on any blood thinning medication.  Will schedule for procedure with Dr. Sands.  Office follow-up in approximately 6 weeks for postprocedural visit.  Call in the interim if needed.    Malou Kelly, APRN  2/20/2024  16:29 CST    Follow Up {Instructions Charge Capture  Follow-up Communications   Return in about 6 weeks (around 4/2/2024).    Patient was given instructions and counseling regarding his condition or for health maintenance advice. Please see specific information pulled into the AVS if appropriate.

## 2024-02-22 ENCOUNTER — TELEPHONE (OUTPATIENT)
Dept: PULMONOLOGY | Facility: CLINIC | Age: 79
End: 2024-02-22
Payer: MEDICARE

## 2024-02-27 ENCOUNTER — PRE-ADMISSION TESTING (OUTPATIENT)
Dept: PREADMISSION TESTING | Facility: HOSPITAL | Age: 79
End: 2024-02-27
Payer: MEDICARE

## 2024-02-27 VITALS
OXYGEN SATURATION: 99 % | HEIGHT: 70 IN | WEIGHT: 154.1 LBS | HEART RATE: 51 BPM | SYSTOLIC BLOOD PRESSURE: 143 MMHG | DIASTOLIC BLOOD PRESSURE: 62 MMHG | RESPIRATION RATE: 16 BRPM | BODY MASS INDEX: 22.06 KG/M2

## 2024-02-27 LAB
ANION GAP SERPL CALCULATED.3IONS-SCNC: 6 MMOL/L (ref 5–15)
BUN SERPL-MCNC: 11 MG/DL (ref 8–23)
BUN/CREAT SERPL: 16.7 (ref 7–25)
CALCIUM SPEC-SCNC: 9 MG/DL (ref 8.6–10.5)
CHLORIDE SERPL-SCNC: 105 MMOL/L (ref 98–107)
CO2 SERPL-SCNC: 29 MMOL/L (ref 22–29)
CREAT SERPL-MCNC: 0.66 MG/DL (ref 0.76–1.27)
DEPRECATED RDW RBC AUTO: 43.8 FL (ref 37–54)
EGFRCR SERPLBLD CKD-EPI 2021: 96.6 ML/MIN/1.73
ERYTHROCYTE [DISTWIDTH] IN BLOOD BY AUTOMATED COUNT: 14 % (ref 12.3–15.4)
GLUCOSE SERPL-MCNC: 97 MG/DL (ref 65–99)
HCT VFR BLD AUTO: 39.5 % (ref 37.5–51)
HGB BLD-MCNC: 12.7 G/DL (ref 13–17.7)
MCH RBC QN AUTO: 27.8 PG (ref 26.6–33)
MCHC RBC AUTO-ENTMCNC: 32.2 G/DL (ref 31.5–35.7)
MCV RBC AUTO: 86.4 FL (ref 79–97)
PLATELET # BLD AUTO: 205 10*3/MM3 (ref 140–450)
PMV BLD AUTO: 11.8 FL (ref 6–12)
POTASSIUM SERPL-SCNC: 4.6 MMOL/L (ref 3.5–5.2)
RBC # BLD AUTO: 4.57 10*6/MM3 (ref 4.14–5.8)
SODIUM SERPL-SCNC: 140 MMOL/L (ref 136–145)
WBC NRBC COR # BLD AUTO: 6.48 10*3/MM3 (ref 3.4–10.8)

## 2024-02-27 PROCEDURE — 80048 BASIC METABOLIC PNL TOTAL CA: CPT

## 2024-02-27 PROCEDURE — 85027 COMPLETE CBC AUTOMATED: CPT

## 2024-02-27 PROCEDURE — 36415 COLL VENOUS BLD VENIPUNCTURE: CPT

## 2024-02-27 NOTE — DISCHARGE INSTRUCTIONS
Before you come to the hospital        Arrival time: AS DIRECTED BY OFFICE              ALL OTHER HOME MEDICATION CHECK WITH YOUR PHYSICIAN (especially if   you are taking diabetes medicines or blood thinners)    Do not take any Erectile Dysfunction medications (EX: CIALIS, VIAGRA) 24 hours prior to surgery.              Eating and drinking restrictions prior to scheduled arrival time    2 Hours before arrival time STOP   Drinking Clear liquids (water, black coffee-NO CREAM,  apple juice-no pulp)    Clear Liquids    Water and flavored water                                                                      Clear Fruit juices, such as cranberry juice and apple juice.  Black coffee (NO cream of any kind, including powdered).  Plain tea  Clear bouillon or broth.  Flavored gelatin.  Soda.  Gatorade or Powerade.    8 Hours before arrival time STOP   All food (includes candy, gum and mints), full liquids, and dairy products  Full liquid examples  Juices that have pulp.  Frozen ice pops that contain fruit pieces.  Coffee with creamer  Milk.  Yogurt.    (It is extremely important that you follow these guidelines to prevent delay or cancelation of your procedure)                       MANAGING PAIN AFTER SURGERY    We know you are probably wondering what your pain will be like after surgery.  Following surgery it is unrealistic to expect you will not have pain.   Pain is how our bodies let us know that something is wrong or cautions us to be careful.  That said, our goal is to make your pain tolerable.    Methods we may use to treat your pain include (oral or IV medications, PCAs, epidurals, nerve blocks, etc.)   While some procedures require IV pain medications for a short time after surgery, transitioning to pain medications by mouth allows for better management of pain.   Your nurse will encourage you to take oral pain medications whenever possible.  IV medications work almost immediately, but only last a short while.   Taking medications by mouth allows for a more constant level of medication in your blood stream for a longer period of time.      Once your pain is out of control it is harder to get back under control.  It is important you are aware when your next dose of pain medication is due.  If you are admitted, your nurse may write the time of your next dose on the white board in your room to help you remember.      We are interested in your pain and encourage you to inform us about aggravating factors during your visit.   Many times a simple repositioning every few hours can make a big difference.    If your physician says it is okay, do not let your pain prevent you from getting out of bed. Be sure to call your nurse for assistance prior to getting up so you do not fall.      Before surgery, please decide your tolerable pain goal.  These faces help describe the pain ratings we use on a 0-10 scale.   Be prepared to tell us your goal and whether or not you take pain or anxiety medications at home.          Preparing for Surgery  Preparing for surgery is an important part of your care. It can make things go more smoothly and help you avoid complications. The steps leading up to surgery may vary among hospitals. Follow all instructions given to you by your health care providers. Ask questions if you do not understand something. Talk about any concerns that you have.  Here are some questions to consider asking before your surgery:  If my surgery is not an emergency (is elective), when would be the best time to have the surgery?  What arrangements do I need to make for work, home, or school?  What will my recovery be like? How long will it be before I can return to normal activities?  Will I need to prepare my home? Will I need to arrange care for me or my children?  Should I expect to have pain after surgery? What are my pain management options? Are there nonmedical options that I can try for pain?  Tell a health care provider  about:  Any allergies you have.  All medicines you are taking, including vitamins, herbs, eye drops, creams, and over-the-counter medicines.  Any problems you or family members have had with anesthetic medicines.  Any blood disorders you have.  Any surgeries you have had.  Any medical conditions you have.  Whether you are pregnant or may be pregnant.  What are the risks?  The risks and complications of surgery depend on the specific procedure that you have. Discuss all the risks with your health care providers before your surgery. Ask about common surgical complications, which may include:  Infection.  Bleeding or a need for blood replacement (transfusion).  Allergic reactions to medicines.  Damage to surrounding nerves, tissues, or structures.  A blood clot.  Scarring.  Failure of the surgery to correct the problem.  Follow these instructions before the procedure:  Several days or weeks before your procedure  You may have a physical exam by your primary health care provider to make sure it is safe for you to have surgery.  You may have testing. This may include a chest X-ray, blood and urine tests, electrocardiogram (ECG), or other testing.  Ask your health care provider about:  Changing or stopping your regular medicines. This is especially important if you are taking diabetes medicines or blood thinners.  Taking medicines such as aspirin and ibuprofen. These medicines can thin your blood. Do not take these medicines unless your health care provider tells you to take them.  Taking over-the-counter medicines, vitamins, herbs, and supplements.  Do not use any products that contain nicotine or tobacco, such as cigarettes and e-cigarettes. If you need help quitting, ask your health care provider.  Avoid alcohol.  Ask your health care provider if there are exercises you can do to prepare for surgery.  Eat a healthy diet.   Plan to have someone 18 years of age or older to take you home from the hospital. We will need to  verify your ride on the morning of surgery if you are being discharged home on the same day. Tell your ride to be expecting a call from the hospital prior to your procedure.   Plan to have a responsible adult care for you for at least 24 hours after you leave the hospital or clinic. This is important.  The day before your procedure  You may be given antibiotic medicine to take by mouth to help prevent infection. Take it as told by your health care provider.  You may be asked to shower with a germ-killing soap.  Follow instructions from your health care provider about eating and drinking restrictions.   Pack comfortable clothes according to your procedure.   The day of your procedure  You may need to take another shower with a germ-killing soap before you leave home in the morning.  With a small sip of water, take only the medicines that you are told to take.  Remove all jewelry including rings.   Leave anything you consider valuable at home except hearing aids if needed.  You do not need to bring your home medications into the hospital.   Do not wear any makeup, nail polish, powder, deodorant, lotion, hair accessories, or anything on your skin or body except your clothes.  If you will be staying in the hospital, bring a case to hold your glasses, contacts, or dentures. You may also want to bring your robe and non-skid footwear.       (Do not use denture adhesives since you will be asked to remove them during  surgery).   If you wear oxygen at home, bring it with you the day of surgery.  If instructed by your health care provider, bring your sleep apnea device with you on the day of your surgery (if this applies to you).  You may want to leave your suitcase and sleep apnea device in the car until after surgery.   Arrive at the hospital as scheduled.  Bring a friend or family member with you who can help to answer questions and be present while you meet with your health care provider.  At the hospital  When you arrive  at the hospital:  Go to registration located at the main entrance of the hospital. You will be registered and given a beeper and a sticker sheet. Take the stickers to the Outpatient nurses desk and place in the black tray. This is to notify staff that you have arrived. Then return to the lobby to wait.   When your beeper lights up and vibrates proceed through the double doors, under the stairs, and a member of the Outpatient Surgery staff will escort you to your preoperative room.  You may have to wear compression sleeves. These help to prevent blood clots and reduce swelling in your legs.  An IV may be inserted into one of your veins.              In the operating room, you may be given one or more of the following:        A medicine to help you relax (sedative).        A medicine to numb the area (local anesthetic).        A medicine to make you fall asleep (general anesthetic).        A medicine that is injected into an area of your body to numb everything below the                      injection site (regional anesthetic).  You may be given an antibiotic through your IV to help prevent infection.  Your surgical site will be marked or identified.    Contact a health care provider if you:  Develop a fever of more than 100.4°F (38°C) or other feelings of illness during the 48 hours before your surgery.  Have symptoms that get worse.  Have questions or concerns about your surgery.  Summary  Preparing for surgery can make the procedure go more smoothly and lower your risk of complications.  Before surgery, make a list of questions and concerns to discuss with your surgeon. Ask about the risks and possible complications.  In the days or weeks before your surgery, follow all instructions from your health care provider. You may need to stop smoking, avoid alcohol, follow eating restrictions, and change or stop your regular medicines.  Contact your surgeon if you develop a fever or other signs of illness during the few  days before your surgery.  This information is not intended to replace advice given to you by your health care provider. Make sure you discuss any questions you have with your health care provider.  Document Revised: 12/21/2018 Document Reviewed: 10/23/2018  Elsevier Patient Education © 2021 [employer]

## 2024-03-04 ENCOUNTER — HOSPITAL ENCOUNTER (OUTPATIENT)
Dept: CT IMAGING | Facility: HOSPITAL | Age: 79
Discharge: HOME | End: 2024-03-04
Payer: MEDICARE

## 2024-03-04 ENCOUNTER — APPOINTMENT (OUTPATIENT)
Dept: GENERAL RADIOLOGY | Facility: HOSPITAL | Age: 79
End: 2024-03-04
Payer: MEDICARE

## 2024-03-04 ENCOUNTER — ANESTHESIA (OUTPATIENT)
Dept: PERIOP | Facility: HOSPITAL | Age: 79
End: 2024-03-04
Payer: MEDICARE

## 2024-03-04 ENCOUNTER — ANESTHESIA EVENT (OUTPATIENT)
Dept: PERIOP | Facility: HOSPITAL | Age: 79
End: 2024-03-04
Payer: MEDICARE

## 2024-03-04 ENCOUNTER — HOSPITAL ENCOUNTER (OUTPATIENT)
Facility: HOSPITAL | Age: 79
Setting detail: HOSPITAL OUTPATIENT SURGERY
Discharge: HOME | End: 2024-03-04
Attending: INTERNAL MEDICINE | Admitting: INTERNAL MEDICINE
Payer: MEDICARE

## 2024-03-04 VITALS
HEART RATE: 62 BPM | TEMPERATURE: 97.3 F | RESPIRATION RATE: 16 BRPM | DIASTOLIC BLOOD PRESSURE: 78 MMHG | SYSTOLIC BLOOD PRESSURE: 142 MMHG | OXYGEN SATURATION: 100 %

## 2024-03-04 DIAGNOSIS — R91.1 NODULE OF LEFT LUNG: ICD-10-CM

## 2024-03-04 DIAGNOSIS — R91.8 LUNG INFILTRATE: ICD-10-CM

## 2024-03-04 PROCEDURE — 88112 CYTOPATH CELL ENHANCE TECH: CPT | Performed by: INTERNAL MEDICINE

## 2024-03-04 PROCEDURE — 25010000002 ONDANSETRON PER 1 MG: Performed by: NURSE ANESTHETIST, CERTIFIED REGISTERED

## 2024-03-04 PROCEDURE — 71045 X-RAY EXAM CHEST 1 VIEW: CPT

## 2024-03-04 PROCEDURE — 76000 FLUOROSCOPY <1 HR PHYS/QHP: CPT

## 2024-03-04 PROCEDURE — 71250 CT THORAX DX C-: CPT

## 2024-03-04 PROCEDURE — 88172 CYTP DX EVAL FNA 1ST EA SITE: CPT | Performed by: INTERNAL MEDICINE

## 2024-03-04 PROCEDURE — 31627 NAVIGATIONAL BRONCHOSCOPY: CPT | Performed by: INTERNAL MEDICINE

## 2024-03-04 PROCEDURE — 25010000002 SUGAMMADEX 200 MG/2ML SOLUTION: Performed by: NURSE ANESTHETIST, CERTIFIED REGISTERED

## 2024-03-04 PROCEDURE — 88312 SPECIAL STAINS GROUP 1: CPT | Performed by: INTERNAL MEDICINE

## 2024-03-04 PROCEDURE — 25810000003 LACTATED RINGERS PER 1000 ML: Performed by: INTERNAL MEDICINE

## 2024-03-04 PROCEDURE — 31629 BRONCHOSCOPY/NEEDLE BX EACH: CPT | Performed by: INTERNAL MEDICINE

## 2024-03-04 PROCEDURE — 31628 BRONCHOSCOPY/LUNG BX EACH: CPT | Performed by: INTERNAL MEDICINE

## 2024-03-04 PROCEDURE — 88305 TISSUE EXAM BY PATHOLOGIST: CPT | Performed by: INTERNAL MEDICINE

## 2024-03-04 PROCEDURE — C1726 CATH, BAL DIL, NON-VASCULAR: HCPCS | Performed by: INTERNAL MEDICINE

## 2024-03-04 PROCEDURE — 25010000002 FENTANYL CITRATE (PF) 100 MCG/2ML SOLUTION: Performed by: NURSE ANESTHETIST, CERTIFIED REGISTERED

## 2024-03-04 PROCEDURE — 25010000002 PROPOFOL 10 MG/ML EMULSION: Performed by: NURSE ANESTHETIST, CERTIFIED REGISTERED

## 2024-03-04 RX ORDER — EPHEDRINE SULFATE 50 MG/ML
INJECTION INTRAVENOUS AS NEEDED
Status: DISCONTINUED | OUTPATIENT
Start: 2024-03-04 | End: 2024-03-05 | Stop reason: SURG

## 2024-03-04 RX ORDER — SODIUM CHLORIDE 9 MG/ML
40 INJECTION, SOLUTION INTRAVENOUS AS NEEDED
Status: DISCONTINUED | OUTPATIENT
Start: 2024-03-04 | End: 2024-03-04 | Stop reason: HOSPADM

## 2024-03-04 RX ORDER — DROPERIDOL 2.5 MG/ML
0.62 INJECTION, SOLUTION INTRAMUSCULAR; INTRAVENOUS ONCE AS NEEDED
Status: DISCONTINUED | OUTPATIENT
Start: 2024-03-04 | End: 2024-03-04 | Stop reason: HOSPADM

## 2024-03-04 RX ORDER — PROPOFOL 10 MG/ML
VIAL (ML) INTRAVENOUS AS NEEDED
Status: DISCONTINUED | OUTPATIENT
Start: 2024-03-04 | End: 2024-03-05 | Stop reason: SURG

## 2024-03-04 RX ORDER — FLUMAZENIL 0.1 MG/ML
0.2 INJECTION INTRAVENOUS AS NEEDED
Status: DISCONTINUED | OUTPATIENT
Start: 2024-03-04 | End: 2024-03-04 | Stop reason: HOSPADM

## 2024-03-04 RX ORDER — FENTANYL CITRATE 50 UG/ML
50 INJECTION, SOLUTION INTRAMUSCULAR; INTRAVENOUS
Status: DISCONTINUED | OUTPATIENT
Start: 2024-03-04 | End: 2024-03-04 | Stop reason: HOSPADM

## 2024-03-04 RX ORDER — BUPIVACAINE HCL/0.9 % NACL/PF 0.125 %
PLASTIC BAG, INJECTION (ML) EPIDURAL AS NEEDED
Status: DISCONTINUED | OUTPATIENT
Start: 2024-03-04 | End: 2024-03-04

## 2024-03-04 RX ORDER — ONDANSETRON 2 MG/ML
INJECTION INTRAMUSCULAR; INTRAVENOUS AS NEEDED
Status: DISCONTINUED | OUTPATIENT
Start: 2024-03-04 | End: 2024-03-05 | Stop reason: SURG

## 2024-03-04 RX ORDER — HYDROCODONE BITARTRATE AND ACETAMINOPHEN 10; 325 MG/1; MG/1
1 TABLET ORAL EVERY 4 HOURS PRN
Status: DISCONTINUED | OUTPATIENT
Start: 2024-03-04 | End: 2024-03-04 | Stop reason: HOSPADM

## 2024-03-04 RX ORDER — ROCURONIUM BROMIDE 10 MG/ML
INJECTION, SOLUTION INTRAVENOUS AS NEEDED
Status: DISCONTINUED | OUTPATIENT
Start: 2024-03-04 | End: 2024-03-05 | Stop reason: SURG

## 2024-03-04 RX ORDER — ONDANSETRON 2 MG/ML
4 INJECTION INTRAMUSCULAR; INTRAVENOUS ONCE AS NEEDED
Status: DISCONTINUED | OUTPATIENT
Start: 2024-03-04 | End: 2024-03-04 | Stop reason: HOSPADM

## 2024-03-04 RX ORDER — SODIUM CHLORIDE 0.9 % (FLUSH) 0.9 %
3 SYRINGE (ML) INJECTION AS NEEDED
Status: DISCONTINUED | OUTPATIENT
Start: 2024-03-04 | End: 2024-03-04 | Stop reason: HOSPADM

## 2024-03-04 RX ORDER — LABETALOL HYDROCHLORIDE 5 MG/ML
5 INJECTION, SOLUTION INTRAVENOUS
Status: DISCONTINUED | OUTPATIENT
Start: 2024-03-04 | End: 2024-03-04 | Stop reason: HOSPADM

## 2024-03-04 RX ORDER — LIDOCAINE HYDROCHLORIDE 20 MG/ML
INJECTION, SOLUTION EPIDURAL; INFILTRATION; INTRACAUDAL; PERINEURAL AS NEEDED
Status: DISCONTINUED | OUTPATIENT
Start: 2024-03-04 | End: 2024-03-05 | Stop reason: SURG

## 2024-03-04 RX ORDER — SODIUM CHLORIDE, SODIUM LACTATE, POTASSIUM CHLORIDE, CALCIUM CHLORIDE 600; 310; 30; 20 MG/100ML; MG/100ML; MG/100ML; MG/100ML
100 INJECTION, SOLUTION INTRAVENOUS CONTINUOUS
Status: DISCONTINUED | OUTPATIENT
Start: 2024-03-04 | End: 2024-03-04 | Stop reason: HOSPADM

## 2024-03-04 RX ORDER — SODIUM CHLORIDE, SODIUM LACTATE, POTASSIUM CHLORIDE, CALCIUM CHLORIDE 600; 310; 30; 20 MG/100ML; MG/100ML; MG/100ML; MG/100ML
1000 INJECTION, SOLUTION INTRAVENOUS CONTINUOUS
Status: DISCONTINUED | OUTPATIENT
Start: 2024-03-04 | End: 2024-03-04 | Stop reason: HOSPADM

## 2024-03-04 RX ORDER — HYDROCODONE BITARTRATE AND ACETAMINOPHEN 5; 325 MG/1; MG/1
1 TABLET ORAL ONCE AS NEEDED
Status: DISCONTINUED | OUTPATIENT
Start: 2024-03-04 | End: 2024-03-04 | Stop reason: HOSPADM

## 2024-03-04 RX ORDER — BUPIVACAINE HCL/0.9 % NACL/PF 0.125 %
PLASTIC BAG, INJECTION (ML) EPIDURAL AS NEEDED
Status: DISCONTINUED | OUTPATIENT
Start: 2024-03-04 | End: 2024-03-05 | Stop reason: SURG

## 2024-03-04 RX ORDER — NALOXONE HCL 0.4 MG/ML
0.4 VIAL (ML) INJECTION AS NEEDED
Status: DISCONTINUED | OUTPATIENT
Start: 2024-03-04 | End: 2024-03-04 | Stop reason: HOSPADM

## 2024-03-04 RX ORDER — LIDOCAINE HYDROCHLORIDE 10 MG/ML
0.5 INJECTION, SOLUTION EPIDURAL; INFILTRATION; INTRACAUDAL; PERINEURAL ONCE AS NEEDED
Status: DISCONTINUED | OUTPATIENT
Start: 2024-03-04 | End: 2024-03-04 | Stop reason: HOSPADM

## 2024-03-04 RX ORDER — FENTANYL CITRATE 50 UG/ML
INJECTION, SOLUTION INTRAMUSCULAR; INTRAVENOUS AS NEEDED
Status: DISCONTINUED | OUTPATIENT
Start: 2024-03-04 | End: 2024-03-05 | Stop reason: SURG

## 2024-03-04 RX ORDER — SODIUM CHLORIDE 0.9 % (FLUSH) 0.9 %
10 SYRINGE (ML) INJECTION AS NEEDED
Status: DISCONTINUED | OUTPATIENT
Start: 2024-03-04 | End: 2024-03-04 | Stop reason: HOSPADM

## 2024-03-04 RX ORDER — SODIUM CHLORIDE 0.9 % (FLUSH) 0.9 %
3-10 SYRINGE (ML) INJECTION AS NEEDED
Status: DISCONTINUED | OUTPATIENT
Start: 2024-03-04 | End: 2024-03-04 | Stop reason: HOSPADM

## 2024-03-04 RX ORDER — SODIUM CHLORIDE 0.9 % (FLUSH) 0.9 %
10 SYRINGE (ML) INJECTION EVERY 12 HOURS SCHEDULED
Status: DISCONTINUED | OUTPATIENT
Start: 2024-03-04 | End: 2024-03-04 | Stop reason: HOSPADM

## 2024-03-04 RX ORDER — SODIUM CHLORIDE 0.9 % (FLUSH) 0.9 %
3 SYRINGE (ML) INJECTION EVERY 12 HOURS SCHEDULED
Status: DISCONTINUED | OUTPATIENT
Start: 2024-03-04 | End: 2024-03-04 | Stop reason: HOSPADM

## 2024-03-04 RX ADMIN — SODIUM CHLORIDE, POTASSIUM CHLORIDE, SODIUM LACTATE AND CALCIUM CHLORIDE 1000 ML: 600; 310; 30; 20 INJECTION, SOLUTION INTRAVENOUS at 12:49

## 2024-03-04 RX ADMIN — EPHEDRINE SULFATE 20 MG: 50 INJECTION INTRAVENOUS at 16:07

## 2024-03-04 RX ADMIN — PROPOFOL 110 MG: 10 INJECTION, EMULSION INTRAVENOUS at 15:52

## 2024-03-04 RX ADMIN — LIDOCAINE HYDROCHLORIDE 100 MG: 20 INJECTION, SOLUTION EPIDURAL; INFILTRATION; INTRACAUDAL; PERINEURAL at 15:52

## 2024-03-04 RX ADMIN — ROCURONIUM BROMIDE 50 MG: 10 INJECTION, SOLUTION INTRAVENOUS at 15:52

## 2024-03-04 RX ADMIN — FENTANYL CITRATE 75 MCG: 50 INJECTION, SOLUTION INTRAMUSCULAR; INTRAVENOUS at 16:34

## 2024-03-04 RX ADMIN — FENTANYL CITRATE 25 MCG: 50 INJECTION, SOLUTION INTRAMUSCULAR; INTRAVENOUS at 15:52

## 2024-03-04 RX ADMIN — SUGAMMADEX 200 MG: 100 INJECTION, SOLUTION INTRAVENOUS at 16:40

## 2024-03-04 RX ADMIN — Medication 200 MCG: at 15:53

## 2024-03-04 RX ADMIN — ONDANSETRON 4 MG: 2 INJECTION INTRAMUSCULAR; INTRAVENOUS at 16:48

## 2024-03-04 NOTE — OP NOTE
Procedure Note (AdvancedBronchoscopy)    Date of Operation: 03/04/24  Pre-op Diagnosis: Nodule of left lung  Post-op Diagnosis: Same  Surgeon: Bruno Sands MD  Anesthesia: General    Operation: Flexible fiberoptic bronchoscopy, Bronchoscopy, Diagnostic, Ion robotic shape sensing navigational bronchoscopy, Radial probe endobronchial ultrasound, Linear endobronchial ultrasound, Transbronchial biopsy, and Transbronchial needle aspirate of lung with navigational and fluoroscopic guidance  Findings: post lobectomy. Concentric ebus view  mediastinum neg, digna: blood  Specimen:   Specimens       ID Source Type Tests Collected By Collected At Frozen?    B Lung, Left Lower Lobe Tissue TISSUE PATHOLOGY EXAM   Bruno Sands MD 3/4/24 1621     Description: left lower lobe bx- ion    C Lung, Left Lower Lobe Fine Needle Aspirate FINE NEEDLE ASPIRATION   Bruno Sands MD 3/4/24 1600     Description: left lower lobe fna          Estimated Blood Loss: Minimal  Complications: none    Indications and History:  The patient is a 79 y.o.  male with Nodule of left lung.  The risks, benefits, complications, treatment options and expected outcomes were discussed with the patient.  The possibilities of reaction to medication, pulmonary aspiration, perforation of a viscus, bleeding, failure to diagnose a condition and creating a complication requiring transfusion or operation were discussed with the patient who freely signed the consent.  Time out was taken prior to the procedure.    Description of Procedure:    After the induction of general anesthesia, the patient was positioned supine and the Olympus BF type P180 bronchoscope was introduced through the endotracheal tube.  The scope was then passed into the trachea.     The trachea, mainstem bronchi, RUL, RML, Bronchus Intermedius, RLL, CHRISTINA, CHRISTINA upper division and lingula, and LLL, and all primary segmental airways were examined and were normal except as described  below:    Endobronchial findings:  Trachea: Normal mucosa  Flor: Sharp  Right main bronchus: Normal mucosa  Right upper lobe bronchus: Normal mucosa  Right middle lobe bronchus: Normal mucosa  Right lower lobe bronchus: Normal mucosa  Left main bronchus: Normal mucosa  Left upper lobe bronchus: Normal mucosa and normal stump  Left lower lobe bronchus: Normal mucosa    The conventional bronchoscope was removed .    Using the planning laptop and Planpoint software, the lesion of interest was identified, and marked, a pathway was generated, and anatomic borders were identified.The ION system was magnetically docked to the ETT. The shape sensing catheter with vision probe was introduced via the  into the ETT.    Registration was started by advancing the bronchoscope into the right and left mainstem bronchi. The main flor was confirmed by rotating the live-view image to match the navigation-view image of the main flor. Registration was completed by advancing the catheter into the identified the lobar bronchi. There was minimal visual divergence. . Using the , the shape sensing bronchoscope was advanced to the target lesion. The mobile C-Arm was brought in, and the vision probe was removed. A peripheral radial ultrasound probe was utilized to better localize the lesion in the left lower lobe superior segment, giving a concentric view.     Transbronchial needle aspirations of the target lesion were performed using an Intuitive Flexision 23 gauge needle and sent for routine cytology. The procedure was guided by fluoroscopy and radial ultrasound. Transbronchial needle aspiration technique was selected because the sampling site was not accessible using standard bronchoscopic techniques. 8 needle aspirations were obtained. Rapid On-Site Evaluation: blood    Transbronchial biopsies of the target lesion were performed  using a CovQuoVadis superDimension pre-marked biopsy forceps instrument and sent for  histopathology examination. The procedure was guided by fluoroscopy and radial ultrasound. Transbronchial biopsy technique was selected because the sampling site was not visible endoscopically.  15  biopsy samples were obtained. 15ml of iced saline was instilled through the Ion robot catheter. The vision probe was reintruced to examine the biopsied area, and then the vision probe, and shape sensing catheter was extracted and catheter guide was disconnected.  The Olympus BF-[id number] EBUS bronchoscope was introduced and the mediastinum was examined via linear ultrasound.  Findings: normal. The bronchoscope was removed.    The patient was undocked from the ion robot arm.  The Patient was extubated and taken to the Recovery Room in satisfactory condition.      Bruno Sands MD at 16:48 CST, 3/4/2024

## 2024-03-04 NOTE — ANESTHESIA PREPROCEDURE EVALUATION
Anesthesia Evaluation     Patient summary reviewed   no history of anesthetic complications:   NPO Solid Status: > 8 hours  NPO Liquid Status: > 8 hours           Airway   Mallampati: II  TM distance: >3 FB  No difficulty expected  Dental - normal exam     Comment: Crowns on upper molars    Pulmonary    (+) a smoker Former, lung cancer (s/p left upper lobectomy),    ROS comment: Left lung nodule  Cardiovascular - negative cardio ROS  Exercise tolerance: good (4-7 METS)    (-) hypertension, past MI, CAD, dysrhythmias, cardiac stents, hyperlipidemia      Neuro/Psych- negative ROS  (-) seizures, TIA, CVA  GI/Hepatic/Renal/Endo - negative ROS   (-) liver disease, no renal disease, diabetes    Musculoskeletal (-) negative ROS    Abdominal    Substance History      OB/GYN          Other      history of cancer (colon s/p resectopm 2018)                  Anesthesia Plan    ASA 2     general     intravenous induction     Anesthetic plan, risks, benefits, and alternatives have been provided, discussed and informed consent has been obtained with: patient.    CODE STATUS:

## 2024-03-04 NOTE — ANESTHESIA PROCEDURE NOTES
Airway  Urgency: elective    Date/Time: 3/4/2024 3:53 PM  Airway not difficult    General Information and Staff    Patient location during procedure: OR  CRNA/CAA: Obi Younger CRNA    Indications and Patient Condition  Indications for airway management: airway protection    Preoxygenated: yes  Mask difficulty assessment: 1 - vent by mask    Final Airway Details  Final airway type: endotracheal airway      Successful airway: ETT  Cuffed: yes   Successful intubation technique: video laryngoscopy  Facilitating devices/methods: intubating stylet  Endotracheal tube insertion site: oral  Blade: Flores  Blade size: 4  ETT size (mm): 8.0  Cormack-Lehane Classification: grade I - full view of glottis  Placement verified by: chest auscultation and capnometry   Cuff volume (mL): 8  Measured from: lips  ETT/EBT  to lips (cm): 23  Number of attempts at approach: 1  Assessment: lips, teeth, and gum same as pre-op and atraumatic intubation

## 2024-03-05 NOTE — ANESTHESIA POSTPROCEDURE EVALUATION
Patient: Rishi Ricketts    Procedure Summary       Date: 03/04/24 Room / Location:  [facility] OR 03 /  [facility]    Anesthesia Start: 1547 Anesthesia Stop: 1706    Procedure: BRONCHOSCOPY WITH ION ROBOT (Bronchus) Diagnosis:       Lung infiltrate      Nodule of left lung      (Lung infiltrate [R91.8])      (Nodule of left lung [R91.1])    Surgeons: Bruno Sands MD Provider: Obi Younger CRNA    Anesthesia Type: general ASA Status: 2            Anesthesia Type: general    Vitals  Vitals Value Taken Time   /70 03/04/24 1730   Temp 97.3 °F (36.3 °C) 03/04/24 1701   Pulse 59 03/04/24 1730   Resp 14 03/04/24 1730   SpO2 100 % 03/04/24 1730           Post Anesthesia Care and Evaluation    Patient location during evaluation: PACU  Patient participation: complete - patient participated  Level of consciousness: awake and alert  Pain management: adequate    Airway patency: patent  Anesthetic complications: No anesthetic complications    Cardiovascular status: acceptable  Respiratory status: acceptable  Hydration status: acceptable    Comments: Blood pressure 142/78, pulse 62, temperature 97.3 °F (36.3 °C), temperature source Temporal, resp. rate 16, SpO2 100%.    Pt discharged from PACU based on adriana score >8

## 2024-03-07 LAB
BEAKER LAB AP INTRAOPERATIVE CONSULTATION: NORMAL
CYTO UR: NORMAL
LAB AP CASE REPORT: NORMAL
LAB AP DIAGNOSIS COMMENT: NORMAL
Lab: NORMAL
PATH REPORT.FINAL DX SPEC: NORMAL
PATH REPORT.GROSS SPEC: NORMAL

## 2024-03-07 NOTE — PROGRESS NOTES
Granulomatous process noted on path, pt informed.  Will just need to order a follow up ct for June when he comes in on 4/1.  Unfortunately no microbiology available.  If he develops infectious symptoms, will need to get cultures/urinary studies for fungal and afb.

## 2024-03-26 NOTE — PROGRESS NOTES
Chief Complaint  Lung infiltrate    Subjective    History of Present Illness {CC  Problem List  Visit Diagnosis   Encounters  Notes  Medications  Labs  Result Review Imaging  Media     Rishi Ricketts presents to [facility] PULMONARY & CRITICAL CARE MEDICINE for:    History of Present Illness  Mr. Ricketts is here for follow up nodular infiltrate in left lower lobe. Has a history of lung cancer and CHRISTINA lobectomy in 2019. Prostate cancer resection 2015 and colon cancer resection in 2018.  He quit smoking several years ago. He underwent ion robotic bronchoscopy with Dr. Sands on 3-4-2024. Granulomatous process was noted on pathology. Dr. Sands recommended follow up ct chest in . No pulmonary complaints today. PND persists. Denies cough.         Prior to Admission medications    Medication Sig Start Date End Date Taking? Authorizing Provider   Ascorbic Acid (Vitamin C) 500 MG chewable tablet  23   Wali Guan MD   latanoprost (XALATAN) 0.005 % ophthalmic solution latanoprost 0.005 % eye drops   INSTILL 1 DROP INTO AFFECTED EYE(S) BY OPHTHALMIC ROUTE ONCE DAILY INTHE EVENING    Wali Guan MD   NON FORMULARY Beet supplement 500 mg once a day 23   Wali Guan MD   timolol (TIMOPTIC) 0.5 % ophthalmic solution Administer 1 drop to both eyes Daily.    Wali Guan MD   Turmeric 400 MG capsule Take  by mouth.    Wali Guan MD   Zinc 50 MG tablet Take 1 tablet by mouth Daily.    Wali Guan MD       Social History     Socioeconomic History    Marital status:    Tobacco Use    Smoking status: Former     Current packs/day: 0.00     Average packs/day: 1 pack/day for 22.0 years (22.0 ttl pk-yrs)     Types: Cigarettes     Start date: 1967     Quit date: 1989     Years since quittin.2     Passive exposure: Never    Smokeless tobacco: Never    Tobacco comments:     light smoker   Vaping Use    Vaping status:  "Never Used   Substance and Sexual Activity    Alcohol use: Yes     Comment: Two glasses of wine per month at most    Drug use: Never    Sexual activity: Not Currently     Partners: Female       Objective   Vital Signs:   /58   Pulse 60   Ht 177 cm (69.69\")   Wt 68.7 kg (151 lb 6.4 oz)   SpO2 98% Comment: RA  BMI 21.92 kg/m²     Physical Exam  Constitutional:       General: He is not in acute distress.  HENT:      Head: Normocephalic.      Nose: Nose normal.      Mouth/Throat:      Mouth: Mucous membranes are moist.   Eyes:      General: No scleral icterus.  Cardiovascular:      Rate and Rhythm: Normal rate.   Pulmonary:      Effort: No respiratory distress.   Abdominal:      General: There is no distension.   Neurological:      Mental Status: He is alert and oriented to person, place, and time.   Psychiatric:         Mood and Affect: Mood normal.         Behavior: Behavior is cooperative.        Result Review :{ Labs  Result Review  Imaging  Med Tab  Media :          No results found for this or any previous visit.                     Assessment and Plan {CC Problem List  Visit Diagnosis  ROS  Review (Popup)  Health Maintenance  Quality  BestPractice  Medications  SmartSets  SnapShot Encounters  Media      Diagnoses and all orders for this visit:    1. Nodule of left lung (Primary)  -     CT Chest Without Contrast; Future    2. History of lung cancer  -     CT Chest Without Contrast; Future    3. PND (post-nasal drip)  -     Azelastine HCl 137 MCG/SPRAY solution; 1 spray into the nostril(s) as directed by provider 2 (Two) Times a Day.  Dispense: 30 mL; Refill: 3        BMI is within normal parameters. No other follow-up for BMI required.      We reviewed pathology which was consistent with granulomatous process. RX Azelastine for PND. Follow up CT chest in 3 month from prior to ensure stability. Office follow up at that time to review. Call sooner if needed.     Malou Kelly, " APRN  4/1/2024  09:55 CDT    Follow Up {Instructions Charge Capture  Follow-up Communications   Return in about 2 months (around 6/1/2024) for Review CT.    Patient was given instructions and counseling regarding his condition or for health maintenance advice. Please see specific information pulled into the AVS if appropriate.

## 2024-04-01 ENCOUNTER — OFFICE VISIT (OUTPATIENT)
Dept: PULMONOLOGY | Facility: CLINIC | Age: 79
End: 2024-04-01
Payer: MEDICARE

## 2024-04-01 VITALS
SYSTOLIC BLOOD PRESSURE: 102 MMHG | WEIGHT: 151.4 LBS | BODY MASS INDEX: 21.67 KG/M2 | HEART RATE: 60 BPM | DIASTOLIC BLOOD PRESSURE: 58 MMHG | OXYGEN SATURATION: 98 % | HEIGHT: 70 IN

## 2024-04-01 DIAGNOSIS — Z85.118 HISTORY OF LUNG CANCER: ICD-10-CM

## 2024-04-01 DIAGNOSIS — R91.1 NODULE OF LEFT LUNG: Primary | ICD-10-CM

## 2024-04-01 DIAGNOSIS — R09.82 PND (POST-NASAL DRIP): ICD-10-CM

## 2024-04-01 PROCEDURE — 99213 OFFICE O/P EST LOW 20 MIN: CPT | Performed by: NURSE PRACTITIONER

## 2024-04-01 PROCEDURE — 1159F MED LIST DOCD IN RCRD: CPT | Performed by: NURSE PRACTITIONER

## 2024-04-01 PROCEDURE — 1160F RVW MEDS BY RX/DR IN RCRD: CPT | Performed by: NURSE PRACTITIONER

## 2024-04-01 RX ORDER — AZELASTINE HYDROCHLORIDE 137 UG/1
1 SPRAY, METERED NASAL 2 TIMES DAILY
Qty: 30 ML | Refills: 3 | Status: SHIPPED | OUTPATIENT
Start: 2024-04-01

## 2024-06-04 ENCOUNTER — HOSPITAL ENCOUNTER (OUTPATIENT)
Dept: CT IMAGING | Facility: HOSPITAL | Age: 79
Discharge: HOME | End: 2024-06-04
Admitting: NURSE PRACTITIONER
Payer: MEDICARE

## 2024-06-04 DIAGNOSIS — R91.1 NODULE OF LEFT LUNG: ICD-10-CM

## 2024-06-04 DIAGNOSIS — Z85.118 HISTORY OF LUNG CANCER: ICD-10-CM

## 2024-06-04 PROCEDURE — 71250 CT THORAX DX C-: CPT

## 2024-06-10 DIAGNOSIS — R91.1 NODULE OF LEFT LUNG: Primary | ICD-10-CM

## 2024-06-14 NOTE — PROGRESS NOTES
Chief Complaint  Nodule of left lung and Lung infiltrate    Subjective    History of Present Illness {CC  Problem List  Visit Diagnosis   Encounters  Notes  Medications  Labs  Result Review Imaging  Media     Rishi Ricketts presents to [facility] PULMONARY & CRITICAL CARE MEDICINE for:    History of Present Illness  Mr. Ricketts is here for follow up nodular infiltrate in left lower lobe. Has a history of lung cancer and CHRISTINA lobectomy in 2019. Prostate cancer resection 2015 and colon cancer resection in 2018.  He quit smoking several years ago. He underwent ion robotic bronchoscopy with Dr. Sands on 3-4-2024. Granulomatous process was noted on pathology. Dr. Sands recommended continued CT follow up at that time.  He had follow-up CT completed this month showing improvement in left lung nodular areas.  Breathing has been okay.  They are still renovating the house.  He has not been sick since last visit.       Prior to Admission medications    Medication Sig Start Date End Date Taking? Authorizing Provider   Ascorbic Acid (Vitamin C) 500 MG chewable tablet  4/21/23   Wali Guan MD   Azelastine HCl 137 MCG/SPRAY solution 1 spray into the nostril(s) as directed by provider 2 (Two) Times a Day. 4/1/24   Malou Kelly APRN   latanoprost (XALATAN) 0.005 % ophthalmic solution latanoprost 0.005 % eye drops   INSTILL 1 DROP INTO AFFECTED EYE(S) BY OPHTHALMIC ROUTE ONCE DAILY INTHE EVENING    Wali Guan MD   NON FORMULARY Beet supplement 500 mg once a day 4/7/23   Wali Guan MD   timolol (TIMOPTIC) 0.5 % ophthalmic solution Administer 1 drop to both eyes Daily.    Wali Guan MD   Turmeric 400 MG capsule Take  by mouth.    Wali Guan MD   Zinc 50 MG tablet Take 1 tablet by mouth Daily.    Wali Guan MD       Social History     Socioeconomic History    Marital status:    Tobacco Use    Smoking status: Former     Current  "packs/day: 0.00     Average packs/day: 1 pack/day for 22.0 years (22.0 ttl pk-yrs)     Types: Cigarettes     Start date: 1967     Quit date: 1989     Years since quittin.4     Passive exposure: Never    Smokeless tobacco: Never    Tobacco comments:     light smoker   Vaping Use    Vaping status: Never Used   Substance and Sexual Activity    Alcohol use: Yes     Comment: Two glasses of wine per month at most    Drug use: Never    Sexual activity: Not Currently     Partners: Female       Objective   Vital Signs:   /70   Pulse 63   Ht 177 cm (69.69\")   Wt 67.6 kg (149 lb)   SpO2 98% Comment: RA  BMI 21.57 kg/m²     Physical Exam  Constitutional:       General: He is not in acute distress.  HENT:      Head: Normocephalic.      Nose: Nose normal.      Mouth/Throat:      Mouth: Mucous membranes are moist.   Eyes:      General: No scleral icterus.  Cardiovascular:      Rate and Rhythm: Normal rate.   Pulmonary:      Effort: No respiratory distress.   Abdominal:      General: There is no distension.   Neurological:      Mental Status: He is alert and oriented to person, place, and time.   Psychiatric:         Mood and Affect: Mood normal.         Behavior: Behavior is cooperative.        Result Review :{ Labs  Result Review  Imaging  Med Tab  Media :          No results found for this or any previous visit.             CT Chest Without Contrast Diagnostic (2024 08:26)     My interpretation of imaging: Resolution large left lung nodular opacities.  A couple small areas of nodularities persist        Assessment and Plan {CC Problem List  Visit Diagnosis  ROS  Review (Popup)  Health Maintenance  Quality  BestPractice  Medications  SmartSets  SnapShot Encounters  Media      Diagnoses and all orders for this visit:    1. Nodule of left lung (Primary)    2. Lung infiltrate        BMI is within normal parameters. No other follow-up for BMI required.      We reviewed CT chest showing " resolution of large left nodular opacities.  There are couple new small areas of nodularities.  We will follow-up with a CT in 6 months on these new areas.  He quit smoking several years ago.  No indication for inhaler therapy.  Office follow-up in 6 months to review CT.  Call sooner if needed.    Malou Kelly, APRN  6/24/2024  13:56 CDT    Follow Up {Instructions Charge Capture  Follow-up Communications   Return in about 6 months (around 12/24/2024) for Review CT.    Patient was given instructions and counseling regarding his condition or for health maintenance advice. Please see specific information pulled into the AVS if appropriate.

## 2024-06-24 ENCOUNTER — OFFICE VISIT (OUTPATIENT)
Dept: PULMONOLOGY | Facility: CLINIC | Age: 79
End: 2024-06-24
Payer: MEDICARE

## 2024-06-24 VITALS
DIASTOLIC BLOOD PRESSURE: 70 MMHG | OXYGEN SATURATION: 98 % | HEART RATE: 63 BPM | SYSTOLIC BLOOD PRESSURE: 112 MMHG | HEIGHT: 70 IN | BODY MASS INDEX: 21.33 KG/M2 | WEIGHT: 149 LBS

## 2024-06-24 DIAGNOSIS — R91.8 LUNG INFILTRATE: ICD-10-CM

## 2024-06-24 DIAGNOSIS — R91.1 NODULE OF LEFT LUNG: Primary | ICD-10-CM

## 2024-06-24 PROCEDURE — 99213 OFFICE O/P EST LOW 20 MIN: CPT | Performed by: NURSE PRACTITIONER

## 2024-12-16 ENCOUNTER — HOSPITAL ENCOUNTER (OUTPATIENT)
Dept: CT IMAGING | Facility: HOSPITAL | Age: 79
Discharge: HOME | End: 2024-12-16
Admitting: NURSE PRACTITIONER
Payer: MEDICARE

## 2024-12-16 ENCOUNTER — TELEPHONE (OUTPATIENT)
Dept: PULMONOLOGY | Facility: CLINIC | Age: 79
End: 2024-12-16
Payer: MEDICARE

## 2024-12-16 DIAGNOSIS — R91.1 NODULE OF LEFT LUNG: Primary | ICD-10-CM

## 2024-12-16 DIAGNOSIS — R91.1 NODULE OF LEFT LUNG: ICD-10-CM

## 2024-12-16 PROCEDURE — 71250 CT THORAX DX C-: CPT

## 2024-12-16 NOTE — TELEPHONE ENCOUNTER
----- Message from Malou Kelly sent at 12/16/2024  8:55 AM CST -----  Let patient know ct chest shows some nodules have resolved and there are some new nodules. There are a couple areas we need to follow up on in a couple of months. Has be been sick? Is he coughing up anything? If so we may need to treat with antibiotics. Otherwise we will follow up ct in a couple of months. We will review in detail at upcoming visit.

## 2024-12-16 NOTE — TELEPHONE ENCOUNTER
I spoke with patient and provided test results. Patient voiced understanding. He states he has not been sick and has not been coughing. He plans to keep his follow up in the office.

## 2024-12-31 ENCOUNTER — OFFICE VISIT (OUTPATIENT)
Dept: PULMONOLOGY | Facility: CLINIC | Age: 79
End: 2024-12-31
Payer: MEDICARE

## 2024-12-31 VITALS
WEIGHT: 153 LBS | OXYGEN SATURATION: 98 % | HEIGHT: 70 IN | SYSTOLIC BLOOD PRESSURE: 122 MMHG | DIASTOLIC BLOOD PRESSURE: 64 MMHG | HEART RATE: 65 BPM | BODY MASS INDEX: 21.9 KG/M2

## 2024-12-31 DIAGNOSIS — R91.8 LUNG NODULES: Primary | Chronic | ICD-10-CM

## 2024-12-31 NOTE — PROGRESS NOTES
Chief Complaint  Nodule of left lung and Lung infiltrate    Subjective    History of Present Illness {CC  Problem List  Visit Diagnosis   Encounters  Notes  Medications  Labs  Result Review Imaging  Media     Rishi Ricketts presents to [facility] PULMONARY & CRITICAL CARE MEDICINE for:    History of Present Illness  Mr. Ricketts is here for follow up nodular infiltrate in left lower lobe. Has a history of lung cancer and CHRISTINA lobectomy in 2019. Prostate cancer resection 2015 and colon cancer resection in 2018.  He quit smoking several years ago. He underwent ion robotic bronchoscopy with Dr. Sands on 3-4-2024. Granulomatous process was noted on pathology. Dr. Sands recommended continued CT follow up at that time.  Recent CT completed showing resolution of prior clustered nodules and some new areas of nodularity throughout the left lung.  He denies any recent illness.  No weight loss.  He denies hemoptysis.  He does not smoke.       Prior to Admission medications    Medication Sig Start Date End Date Taking? Authorizing Provider   Ascorbic Acid (Vitamin C) 500 MG chewable tablet  4/21/23   Wali Guan MD   Azelastine HCl 137 MCG/SPRAY solution 1 spray into the nostril(s) as directed by provider 2 (Two) Times a Day.  Patient not taking: Reported on 6/24/2024 4/1/24   Malou Kelly APRN   latanoprost (XALATAN) 0.005 % ophthalmic solution latanoprost 0.005 % eye drops   INSTILL 1 DROP INTO AFFECTED EYE(S) BY OPHTHALMIC ROUTE ONCE DAILY INTHE EVENING    Wali Guan MD   NON FORMULARY Beet supplement 500 mg once a day 4/7/23   Wali Guan MD   timolol (TIMOPTIC) 0.5 % ophthalmic solution Administer 1 drop to both eyes Daily.    Wali Guan MD   Turmeric 400 MG capsule Take  by mouth.    Wali Guan MD   Zinc 50 MG tablet Take 1 tablet by mouth Daily.    Wali Guan MD       Social History     Socioeconomic History    Marital status:  "   Tobacco Use    Smoking status: Former     Current packs/day: 0.00     Average packs/day: 1 pack/day for 22.0 years (22.0 ttl pk-yrs)     Types: Cigarettes     Start date: 1967     Quit date: 1989     Years since quittin.9     Passive exposure: Never    Smokeless tobacco: Never    Tobacco comments:     light smoker   Vaping Use    Vaping status: Never Used   Substance and Sexual Activity    Alcohol use: Yes     Comment: Two glasses of wine per month at most    Drug use: Never    Sexual activity: Not Currently     Partners: Female       Objective   Vital Signs:   /64   Pulse 65   Ht 177 cm (69.69\")   Wt 69.4 kg (153 lb)   SpO2 98% Comment: RA  BMI 22.15 kg/m²     Physical Exam  Constitutional:       General: He is not in acute distress.  HENT:      Head: Normocephalic.      Nose: Nose normal.      Mouth/Throat:      Mouth: Mucous membranes are moist.   Eyes:      General: No scleral icterus.  Cardiovascular:      Rate and Rhythm: Normal rate.   Pulmonary:      Effort: No respiratory distress.   Abdominal:      General: There is no distension.   Neurological:      Mental Status: He is alert and oriented to person, place, and time.   Psychiatric:         Mood and Affect: Mood normal.         Behavior: Behavior is cooperative.        Result Review :{ Labs  Result Review  Imaging  Med Tab  Media :          No results found for this or any previous visit.               CT Chest Without Contrast Diagnostic (2024 08:12)   My interpretation of imaging: New areas of nodularity throughout left lung          Assessment and Plan {CC Problem List  Visit Diagnosis  ROS  Review (Popup)  Health Maintenance  Quality  BestPractice  Medications  SmartSets  SnapShot Encounters  Media      Diagnoses and all orders for this visit:    1. Lung nodules (Primary)  -     CT Chest Without Contrast; Future          BMI is within normal parameters. No other follow-up for BMI " required.      Plan as above.  CT follow-up in 3 months to ensure stability.  Call sooner if needed.  He would like his surveillance abdominal CT(ordered by PCP) done on the same day as his chest CT.    Malou Kelly, APRN  12/31/2024  15:05 CST    Follow Up {Instructions Charge Capture  Follow-up Communications   Return in about 3 months (around 3/31/2025) for Review CT.    Patient was given instructions and counseling regarding his condition or for health maintenance advice. Please see specific information pulled into the AVS if appropriate.

## 2025-01-03 ENCOUNTER — TRANSCRIBE ORDERS (OUTPATIENT)
Dept: ADMINISTRATIVE | Facility: HOSPITAL | Age: 80
End: 2025-01-03
Payer: MEDICARE

## 2025-01-03 DIAGNOSIS — C61 MALIGNANT NEOPLASM OF PROSTATE: Primary | ICD-10-CM

## 2025-03-21 ENCOUNTER — TELEPHONE (OUTPATIENT)
Dept: PULMONOLOGY | Facility: CLINIC | Age: 80
End: 2025-03-21
Payer: MEDICARE

## 2025-03-21 NOTE — TELEPHONE ENCOUNTER
Received call from [employer] lettcait Westfall know that this patient has an appointment on Monday for CT without contrast. The patient has a CT of abdomen/pelvis with contrast ordered by PCP so she is wondering if she can change the order for the CT to use contrast.

## 2025-03-24 ENCOUNTER — RESULTS FOLLOW-UP (OUTPATIENT)
Dept: CT IMAGING | Facility: HOSPITAL | Age: 80
End: 2025-03-24
Payer: MEDICARE

## 2025-03-24 ENCOUNTER — HOSPITAL ENCOUNTER (OUTPATIENT)
Dept: CT IMAGING | Facility: HOSPITAL | Age: 80
Discharge: HOME | End: 2025-03-24
Payer: MEDICARE

## 2025-03-24 DIAGNOSIS — C61 MALIGNANT NEOPLASM OF PROSTATE: ICD-10-CM

## 2025-03-24 DIAGNOSIS — R91.8 LUNG NODULES: Chronic | ICD-10-CM

## 2025-03-24 PROCEDURE — 25510000001 IOPAMIDOL 61 % SOLUTION: Performed by: NURSE PRACTITIONER

## 2025-03-24 PROCEDURE — 71260 CT THORAX DX C+: CPT

## 2025-03-24 PROCEDURE — 74177 CT ABD & PELVIS W/CONTRAST: CPT

## 2025-03-24 RX ORDER — IOPAMIDOL 612 MG/ML
100 INJECTION, SOLUTION INTRAVASCULAR
Status: COMPLETED | OUTPATIENT
Start: 2025-03-24 | End: 2025-03-24

## 2025-03-24 RX ADMIN — IOPAMIDOL 100 ML: 612 INJECTION, SOLUTION INTRAVENOUS at 08:56

## 2025-03-26 NOTE — PROGRESS NOTES
Chief Complaint  Lung Nodule    Subjective    History of Present Illness {CC  Problem List  Visit Diagnosis   Encounters  Notes  Medications  Labs  Result Review Imaging  Media     Rishi Ricketts presents to [facility] PULMONARY & CRITICAL CARE MEDICINE for:    History of Present Illness  Mr. Ricketts is here for follow up nodular infiltrate in left lower lobe. Has a history of lung cancer and CHRISTINA lobectomy in 2019. Prostate cancer resection 2015 and colon cancer resection in 2018.  He quit smoking several years ago. He underwent ion robotic bronchoscopy with Dr. Sands on 3-4-2024. Granulomatous process was noted on pathology. Dr. Sands recommended continued CT follow up at that time. CT chest showing improvement in left lung nodules with a new LLL nodule. PND persists. He has seen Dr. Bautista in the past but was lost to follow up. He notices runny nose when he is in his kitchen. He has moved into a different house and symptoms are similar in the same parts of his house.        Prior to Admission medications    Medication Sig Start Date End Date Taking? Authorizing Provider   Ascorbic Acid (Vitamin C) 500 MG chewable tablet  4/21/23   Wali Guan MD   Azelastine HCl 137 MCG/SPRAY solution 1 spray into the nostril(s) as directed by provider 2 (Two) Times a Day.  Patient not taking: Reported on 12/31/2024 4/1/24   Malou Kelly APRN   latanoprost (XALATAN) 0.005 % ophthalmic solution latanoprost 0.005 % eye drops   INSTILL 1 DROP INTO AFFECTED EYE(S) BY OPHTHALMIC ROUTE ONCE DAILY INTHE EVENING    Wali Guan MD   NON FORMULARY Beet supplement 500 mg once a day 4/7/23   Wali Guan MD   timolol (TIMOPTIC) 0.5 % ophthalmic solution Administer 1 drop to both eyes Daily.    Wali Guan MD   Turmeric 400 MG capsule Take  by mouth.    Wali Guan MD   Zinc 50 MG tablet Take 1 tablet by mouth Daily.    Wali Guan MD       Social  "History     Socioeconomic History    Marital status:    Tobacco Use    Smoking status: Former     Current packs/day: 0.00     Average packs/day: 1 pack/day for 22.0 years (22.0 ttl pk-yrs)     Types: Cigarettes     Start date: 1967     Quit date: 1989     Years since quittin.2     Passive exposure: Past    Smokeless tobacco: Never   Vaping Use    Vaping status: Never Used   Substance and Sexual Activity    Alcohol use: Yes     Comment: Two glasses of wine per month at most    Drug use: Never    Sexual activity: Not Currently     Partners: Female       Objective   Vital Signs:   /74   Pulse 53   Ht 177 cm (69.69\")   Wt 68.7 kg (151 lb 6.4 oz)   SpO2 100% Comment: RA  BMI 21.92 kg/m²     Physical Exam  Constitutional:       General: He is not in acute distress.  HENT:      Head: Normocephalic.      Nose: Nose normal.      Mouth/Throat:      Mouth: Mucous membranes are moist.   Eyes:      General: No scleral icterus.  Cardiovascular:      Rate and Rhythm: Normal rate.   Pulmonary:      Effort: No respiratory distress.   Abdominal:      General: There is no distension.   Neurological:      Mental Status: He is alert and oriented to person, place, and time.   Psychiatric:         Mood and Affect: Mood normal.         Behavior: Behavior is cooperative.        Result Review :{ Labs  Result Review  Imaging  Med Tab  Media :          No results found for this or any previous visit.      Rishi HIGGINS Jamarcus  reports that he quit smoking about 36 years ago. His smoking use included cigarettes. He started smoking about 58 years ago. He has a 22 pack-year smoking history. He has been exposed to tobacco smoke. He has never used smokeless tobacco.            CT Chest With Contrast Diagnostic (2025 08:55)   My interpretation of imaging:  improvement in nodules throughout left lung, new LLL nodule           Assessment and Plan {CC Problem List  Visit Diagnosis  ROS  Review (Popup)  Health " Maintenance  Quality  BestPractice  Medications  SmartSets  SnapShot Encounters  Media      Diagnoses and all orders for this visit:    1. Lung nodules (Primary)  Comments:  improved.    2. Nodule of left lung  Comments:  follow up CT in 3 months  Orders:  -     CT Chest Without Contrast; Future    3. Non-seasonal allergic rhinitis, unspecified trigger  Comments:  tried and failed nasal sprays, has seen Dr. Bautista in past and lost to follow up, refer back to allergy.  Orders:  -     Ambulatory Referral to Allergy        BMI is within normal parameters. No other follow-up for BMI required.      Plan as above. Office follow up in a few months to review CT. Call sooner if needed.     Malou Kelly, APRN  3/31/2025  09:13 CDT    Follow Up {Instructions Charge Capture  Follow-up Communications   Return in about 3 months (around 6/30/2025) for with doc K please .    Patient was given instructions and counseling regarding his condition or for health maintenance advice. Please see specific information pulled into the AVS if appropriate.

## 2025-03-31 ENCOUNTER — OFFICE VISIT (OUTPATIENT)
Dept: PULMONOLOGY | Facility: CLINIC | Age: 80
End: 2025-03-31
Payer: MEDICARE

## 2025-03-31 VITALS
DIASTOLIC BLOOD PRESSURE: 74 MMHG | BODY MASS INDEX: 21.67 KG/M2 | OXYGEN SATURATION: 100 % | SYSTOLIC BLOOD PRESSURE: 118 MMHG | HEIGHT: 70 IN | WEIGHT: 151.4 LBS | HEART RATE: 53 BPM

## 2025-03-31 DIAGNOSIS — J30.89 NON-SEASONAL ALLERGIC RHINITIS, UNSPECIFIED TRIGGER: Chronic | ICD-10-CM

## 2025-03-31 DIAGNOSIS — R91.1 NODULE OF LEFT LUNG: Chronic | ICD-10-CM

## 2025-03-31 DIAGNOSIS — R91.8 LUNG NODULES: Primary | Chronic | ICD-10-CM

## 2025-03-31 PROCEDURE — 1159F MED LIST DOCD IN RCRD: CPT | Performed by: NURSE PRACTITIONER

## 2025-03-31 PROCEDURE — 1160F RVW MEDS BY RX/DR IN RCRD: CPT | Performed by: NURSE PRACTITIONER

## 2025-03-31 PROCEDURE — 99214 OFFICE O/P EST MOD 30 MIN: CPT | Performed by: NURSE PRACTITIONER

## (undated) DEVICE — TBG PRESS EXT

## (undated) DEVICE — SENSR O2 OXIMAX FNGR A/ 18IN NONSTR

## (undated) DEVICE — SINGLE USE BIOPSY VALVE MAJ-210: Brand: SINGLE USE BIOPSY VALVE (STERILE)

## (undated) DEVICE — SWIVEL CONNECTOR

## (undated) DEVICE — TRAP,MUCUS SPECIMEN, 80CC: Brand: MEDLINE

## (undated) DEVICE — Device: Brand: DEFENDO AIR/WATER/SUCTION AND BIOPSY VALVE

## (undated) DEVICE — ADAPT SWVL FIBROPTIC BRONCH

## (undated) DEVICE — TBG SMPL FLTR LINE NASL 02/C02 A/ BX/100

## (undated) DEVICE — BIOPSY NEEDLE, 23G: Brand: FLEXISION

## (undated) DEVICE — SNAR POLYP SENSATION MICRO OVL 13 240X40

## (undated) DEVICE — FRCP BIOP SUPERDIMENSION PREMARK

## (undated) DEVICE — THE CHANNEL CLEANING BRUSH IS A NYLON FLEXI BRUSH ATTACHED TO A FLEXIBLE PLASTIC SHEATH DESIGNED TO SAFELY REMOVE DEBRIS FROM FLEXIBLE ENDOSCOPES.

## (undated) DEVICE — THE SINGLE USE ETRAP – POLYP TRAP IS USED FOR SUCTION RETRIEVAL OF ENDOSCOPICALLY REMOVED POLYPS.: Brand: ETRAP

## (undated) DEVICE — Device: Brand: ION

## (undated) DEVICE — CUFF,BP,DISP,1 TUBE,ADULT,HP: Brand: MEDLINE

## (undated) DEVICE — SINGLE USE SUCTION VALVE MAJ-209: Brand: SINGLE USE SUCTION VALVE (STERILE)

## (undated) DEVICE — MASK,OXYGEN,MED CONC,ADLT,7' TUB, UC: Brand: PENDING

## (undated) DEVICE — YANKAUER,BULB TIP WITH VENT: Brand: ARGYLE

## (undated) DEVICE — Device: Brand: BALLOON

## (undated) DEVICE — VISION PROBE ADAPTER AND SUCTION ADAPTER